# Patient Record
Sex: MALE | URBAN - NONMETROPOLITAN AREA
[De-identification: names, ages, dates, MRNs, and addresses within clinical notes are randomized per-mention and may not be internally consistent; named-entity substitution may affect disease eponyms.]

---

## 2020-01-16 ENCOUNTER — APPOINTMENT (RX ONLY)
Dept: URBAN - NONMETROPOLITAN AREA CLINIC 1 | Facility: CLINIC | Age: 70
Setting detail: DERMATOLOGY
End: 2020-01-16

## 2020-01-16 DIAGNOSIS — L81.4 OTHER MELANIN HYPERPIGMENTATION: ICD-10-CM

## 2020-01-16 DIAGNOSIS — L82.1 OTHER SEBORRHEIC KERATOSIS: ICD-10-CM

## 2020-01-16 DIAGNOSIS — D18.0 HEMANGIOMA: ICD-10-CM

## 2020-01-16 DIAGNOSIS — L82.0 INFLAMED SEBORRHEIC KERATOSIS: ICD-10-CM

## 2020-01-16 DIAGNOSIS — L98.8 OTHER SPECIFIED DISORDERS OF THE SKIN AND SUBCUTANEOUS TISSUE: ICD-10-CM

## 2020-01-16 PROBLEM — D18.01 HEMANGIOMA OF SKIN AND SUBCUTANEOUS TISSUE: Status: ACTIVE | Noted: 2020-01-16

## 2020-01-16 PROBLEM — F32.9 MAJOR DEPRESSIVE DISORDER, SINGLE EPISODE, UNSPECIFIED: Status: ACTIVE | Noted: 2020-01-16

## 2020-01-16 PROCEDURE — ? LIQUID NITROGEN

## 2020-01-16 PROCEDURE — ? COUNSELING

## 2020-01-16 PROCEDURE — 99203 OFFICE O/P NEW LOW 30 MIN: CPT | Mod: 25

## 2020-01-16 PROCEDURE — 17110 DESTRUCTION B9 LES UP TO 14: CPT

## 2020-01-16 ASSESSMENT — LOCATION SIMPLE DESCRIPTION DERM
LOCATION SIMPLE: LEFT UPPER BACK
LOCATION SIMPLE: UPPER BACK
LOCATION SIMPLE: CHEST
LOCATION SIMPLE: RIGHT FOREHEAD
LOCATION SIMPLE: LEFT FOREHEAD
LOCATION SIMPLE: SUPERIOR FOREHEAD
LOCATION SIMPLE: RIGHT LIP

## 2020-01-16 ASSESSMENT — LOCATION DETAILED DESCRIPTION DERM
LOCATION DETAILED: LEFT SUPERIOR UPPER BACK
LOCATION DETAILED: LEFT SUPERIOR MEDIAL FOREHEAD
LOCATION DETAILED: LEFT MEDIAL SUPERIOR CHEST
LOCATION DETAILED: SUPERIOR MID FOREHEAD
LOCATION DETAILED: RIGHT SUPERIOR VERMILION LIP
LOCATION DETAILED: INFERIOR THORACIC SPINE
LOCATION DETAILED: RIGHT INFERIOR FOREHEAD

## 2020-01-16 ASSESSMENT — LOCATION ZONE DERM
LOCATION ZONE: FACE
LOCATION ZONE: LIP
LOCATION ZONE: TRUNK

## 2020-01-16 NOTE — PROCEDURE: MIPS QUALITY
Quality 226: Preventive Care And Screening: Tobacco Use: Screening And Cessation Intervention: Patient screened for tobacco use and is an ex/non-smoker
Quality 111:Pneumonia Vaccination Status For Older Adults: Pneumococcal Vaccination Previously Received
Detail Level: Generalized
Quality 130: Documentation Of Current Medications In The Medical Record: Current Medications Documented

## 2020-01-16 NOTE — HPI: EVALUATION OF SKIN LESION(S)
How Severe Are Your Spot(S)?: moderate
Have Your Spot(S) Been Treated In The Past?: has not been treated
Hpi Title: Evaluation of Skin Lesions
Family Member: Daughter
Year Removed: 1900

## 2022-08-22 ENCOUNTER — HOSPITAL ENCOUNTER (INPATIENT)
Facility: MEDICAL CENTER | Age: 72
LOS: 7 days | DRG: 085 | End: 2022-08-29
Attending: EMERGENCY MEDICINE | Admitting: SURGERY
Payer: MEDICARE

## 2022-08-22 ENCOUNTER — HOSPITAL ENCOUNTER (OUTPATIENT)
Dept: RADIOLOGY | Facility: MEDICAL CENTER | Age: 72
End: 2022-08-22

## 2022-08-22 ENCOUNTER — APPOINTMENT (OUTPATIENT)
Dept: RADIOLOGY | Facility: MEDICAL CENTER | Age: 72
DRG: 085 | End: 2022-08-22
Attending: EMERGENCY MEDICINE
Payer: MEDICARE

## 2022-08-22 ENCOUNTER — APPOINTMENT (OUTPATIENT)
Dept: RADIOLOGY | Facility: MEDICAL CENTER | Age: 72
DRG: 085 | End: 2022-08-22
Attending: SURGERY
Payer: MEDICARE

## 2022-08-22 DIAGNOSIS — S06.330A: ICD-10-CM

## 2022-08-22 DIAGNOSIS — Z78.9 NO CONTRAINDICATION TO DEEP VEIN THROMBOSIS (DVT) PROPHYLAXIS: ICD-10-CM

## 2022-08-22 DIAGNOSIS — G20.A1 PARKINSON DISEASE (HCC): ICD-10-CM

## 2022-08-22 DIAGNOSIS — F01.50 VASCULAR DEMENTIA WITHOUT BEHAVIORAL DISTURBANCE (HCC): ICD-10-CM

## 2022-08-22 DIAGNOSIS — T14.90XA TRAUMA: ICD-10-CM
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
## 2022-08-22 DIAGNOSIS — I61.9 INTRAPARENCHYMAL HEMORRHAGE OF BRAIN (HCC): ICD-10-CM

## 2022-08-22 PROBLEM — Z53.09 CONTRAINDICATION TO DEEP VEIN THROMBOSIS (DVT) PROPHYLAXIS: Status: ACTIVE | Noted: 2022-08-22

## 2022-08-22 PROBLEM — U07.1 LAB TEST POSITIVE FOR DETECTION OF COVID-19 VIRUS: Status: ACTIVE | Noted: 2022-08-22

## 2022-08-22 PROBLEM — F03.90 DEMENTIA (HCC): Status: ACTIVE | Noted: 2022-08-22

## 2022-08-22 PROBLEM — S01.01XA SCALP LACERATION, INITIAL ENCOUNTER: Status: ACTIVE | Noted: 2022-08-22

## 2022-08-22 LAB
CFT BLD TEG: 3.6 MIN (ref 4.6–9.1)
CFT P HPASE BLD TEG: 3.7 MIN (ref 4.3–8.3)
CLOT ANGLE BLD TEG: 76.4 DEGREES (ref 63–78)
CLOT LYSIS 30M P MA LENFR BLD TEG: 0.2 % (ref 0–2.6)
CT.EXTRINSIC BLD ROTEM: 0.9 MIN (ref 0.8–2.1)
GLUCOSE BLD STRIP.AUTO-MCNC: 79 MG/DL (ref 65–99)
GLUCOSE BLD STRIP.AUTO-MCNC: 88 MG/DL (ref 65–99)
MCF BLD TEG: 63.5 MM (ref 52–69)
MCF.PLATELET INHIB BLD ROTEM: 23.3 MM (ref 15–32)
PA AA BLD-ACNC: 0 % (ref 0–11)
PA ADP BLD-ACNC: 3.5 % (ref 0–17)
TEG ALGORITHM TGALG: ABNORMAL

## 2022-08-22 PROCEDURE — 700101 HCHG RX REV CODE 250: Performed by: SURGERY

## 2022-08-22 PROCEDURE — 85384 FIBRINOGEN ACTIVITY: CPT

## 2022-08-22 PROCEDURE — G0390 TRAUMA RESPONS W/HOSP CRITI: HCPCS

## 2022-08-22 PROCEDURE — 99291 CRITICAL CARE FIRST HOUR: CPT | Performed by: SURGERY

## 2022-08-22 PROCEDURE — 700105 HCHG RX REV CODE 258: Performed by: SURGERY

## 2022-08-22 PROCEDURE — 99291 CRITICAL CARE FIRST HOUR: CPT

## 2022-08-22 PROCEDURE — 700111 HCHG RX REV CODE 636 W/ 250 OVERRIDE (IP)

## 2022-08-22 PROCEDURE — 96374 THER/PROPH/DIAG INJ IV PUSH: CPT

## 2022-08-22 PROCEDURE — 70450 CT HEAD/BRAIN W/O DYE: CPT | Mod: ME

## 2022-08-22 PROCEDURE — 700111 HCHG RX REV CODE 636 W/ 250 OVERRIDE (IP): Performed by: SURGERY

## 2022-08-22 PROCEDURE — 85576 BLOOD PLATELET AGGREGATION: CPT

## 2022-08-22 PROCEDURE — 8E0ZXY6 ISOLATION: ICD-10-PCS | Performed by: SURGERY

## 2022-08-22 PROCEDURE — 85347 COAGULATION TIME ACTIVATED: CPT

## 2022-08-22 PROCEDURE — 770022 HCHG ROOM/CARE - ICU (200)

## 2022-08-22 PROCEDURE — 82962 GLUCOSE BLOOD TEST: CPT

## 2022-08-22 RX ORDER — AMOXICILLIN 250 MG
1 CAPSULE ORAL
Status: DISCONTINUED | OUTPATIENT
Start: 2022-08-22 | End: 2022-08-24

## 2022-08-22 RX ORDER — OXYCODONE HYDROCHLORIDE 5 MG/1
5 TABLET ORAL
Status: DISCONTINUED | OUTPATIENT
Start: 2022-08-22 | End: 2022-08-23

## 2022-08-22 RX ORDER — LEVETIRACETAM 500 MG/5ML
500 INJECTION, SOLUTION, CONCENTRATE INTRAVENOUS EVERY 12 HOURS
Status: DISCONTINUED | OUTPATIENT
Start: 2022-08-22 | End: 2022-08-24

## 2022-08-22 RX ORDER — BISACODYL 10 MG
10 SUPPOSITORY, RECTAL RECTAL
Status: DISCONTINUED | OUTPATIENT
Start: 2022-08-22 | End: 2022-08-29 | Stop reason: HOSPADM

## 2022-08-22 RX ORDER — POLYETHYLENE GLYCOL 3350 17 G/17G
1 POWDER, FOR SOLUTION ORAL 2 TIMES DAILY
Status: DISCONTINUED | OUTPATIENT
Start: 2022-08-22 | End: 2022-08-24

## 2022-08-22 RX ORDER — HALOPERIDOL 5 MG/ML
INJECTION INTRAMUSCULAR
Status: COMPLETED
Start: 2022-08-22 | End: 2022-08-22

## 2022-08-22 RX ORDER — MIDAZOLAM HYDROCHLORIDE 1 MG/ML
0.5 INJECTION INTRAMUSCULAR; INTRAVENOUS ONCE
Status: DISCONTINUED | OUTPATIENT
Start: 2022-08-22 | End: 2022-08-23

## 2022-08-22 RX ORDER — ACETAMINOPHEN 325 MG/1
650 TABLET ORAL EVERY 6 HOURS PRN
Status: DISCONTINUED | OUTPATIENT
Start: 2022-08-27 | End: 2022-08-24

## 2022-08-22 RX ORDER — BACITRACIN ZINC AND POLYMYXIN B SULFATE 500; 1000 [USP'U]/G; [USP'U]/G
OINTMENT TOPICAL 3 TIMES DAILY
Status: DISCONTINUED | OUTPATIENT
Start: 2022-08-22 | End: 2022-08-26

## 2022-08-22 RX ORDER — HYDRALAZINE HYDROCHLORIDE 20 MG/ML
10 INJECTION INTRAMUSCULAR; INTRAVENOUS EVERY 4 HOURS PRN
Status: DISCONTINUED | OUTPATIENT
Start: 2022-08-22 | End: 2022-08-26

## 2022-08-22 RX ORDER — DOCUSATE SODIUM 100 MG/1
100 CAPSULE, LIQUID FILLED ORAL 2 TIMES DAILY
Status: DISCONTINUED | OUTPATIENT
Start: 2022-08-22 | End: 2022-08-24

## 2022-08-22 RX ORDER — AMOXICILLIN 250 MG
1 CAPSULE ORAL NIGHTLY
Status: DISCONTINUED | OUTPATIENT
Start: 2022-08-22 | End: 2022-08-24

## 2022-08-22 RX ORDER — ENEMA 19; 7 G/133ML; G/133ML
1 ENEMA RECTAL
Status: DISCONTINUED | OUTPATIENT
Start: 2022-08-22 | End: 2022-08-29 | Stop reason: HOSPADM

## 2022-08-22 RX ORDER — OXYCODONE HYDROCHLORIDE 10 MG/1
10 TABLET ORAL
Status: DISCONTINUED | OUTPATIENT
Start: 2022-08-22 | End: 2022-08-23

## 2022-08-22 RX ORDER — SODIUM CHLORIDE 9 MG/ML
INJECTION, SOLUTION INTRAVENOUS CONTINUOUS
Status: DISCONTINUED | OUTPATIENT
Start: 2022-08-22 | End: 2022-08-26

## 2022-08-22 RX ORDER — ACETAMINOPHEN 325 MG/1
650 TABLET ORAL EVERY 6 HOURS
Status: DISCONTINUED | OUTPATIENT
Start: 2022-08-22 | End: 2022-08-24

## 2022-08-22 RX ORDER — ONDANSETRON 4 MG/1
4 TABLET, ORALLY DISINTEGRATING ORAL EVERY 4 HOURS PRN
Status: DISCONTINUED | OUTPATIENT
Start: 2022-08-22 | End: 2022-08-24

## 2022-08-22 RX ORDER — ONDANSETRON 2 MG/ML
4 INJECTION INTRAMUSCULAR; INTRAVENOUS EVERY 4 HOURS PRN
Status: DISCONTINUED | OUTPATIENT
Start: 2022-08-22 | End: 2022-08-26

## 2022-08-22 RX ORDER — FAMOTIDINE 20 MG/1
20 TABLET, FILM COATED ORAL 2 TIMES DAILY
Status: DISCONTINUED | OUTPATIENT
Start: 2022-08-22 | End: 2022-08-24

## 2022-08-22 RX ORDER — LEVETIRACETAM 500 MG/1
500 TABLET ORAL EVERY 12 HOURS
Status: DISCONTINUED | OUTPATIENT
Start: 2022-08-22 | End: 2022-08-24

## 2022-08-22 RX ORDER — HALOPERIDOL 5 MG/ML
2 INJECTION INTRAMUSCULAR EVERY 4 HOURS PRN
Status: DISCONTINUED | OUTPATIENT
Start: 2022-08-22 | End: 2022-08-22

## 2022-08-22 RX ADMIN — FAMOTIDINE 20 MG: 10 INJECTION, SOLUTION INTRAVENOUS at 17:14

## 2022-08-22 RX ADMIN — HALOPERIDOL 2 MG: 5 INJECTION INTRAMUSCULAR at 15:41

## 2022-08-22 RX ADMIN — HYDRALAZINE HYDROCHLORIDE 10 MG: 20 INJECTION INTRAMUSCULAR; INTRAVENOUS at 16:37

## 2022-08-22 RX ADMIN — SODIUM CHLORIDE: 9 INJECTION, SOLUTION INTRAVENOUS at 16:31

## 2022-08-22 RX ADMIN — HYDRALAZINE HYDROCHLORIDE 10 MG: 20 INJECTION INTRAMUSCULAR; INTRAVENOUS at 23:25

## 2022-08-22 RX ADMIN — LEVETIRACETAM 500 MG: 100 INJECTION, SOLUTION INTRAVENOUS at 17:14

## 2022-08-22 RX ADMIN — HALOPERIDOL LACTATE 2 MG: 5 INJECTION, SOLUTION INTRAMUSCULAR at 15:41

## 2022-08-22 RX ADMIN — Medication 1 EACH: at 17:14

## 2022-08-22 ASSESSMENT — LIFESTYLE VARIABLES
EVER HAD A DRINK FIRST THING IN THE MORNING TO STEADY YOUR NERVES TO GET RID OF A HANGOVER: NO
HAVE PEOPLE ANNOYED YOU BY CRITICIZING YOUR DRINKING: NO
TOTAL SCORE: 0
TOTAL SCORE: 0
DO YOU DRINK ALCOHOL: NO
HAVE YOU EVER FELT YOU SHOULD CUT DOWN ON YOUR DRINKING: NO
CONSUMPTION TOTAL: INCOMPLETE
TOTAL SCORE: 0
EVER FELT BAD OR GUILTY ABOUT YOUR DRINKING: NO

## 2022-08-22 ASSESSMENT — COPD QUESTIONNAIRES
DURING THE PAST 4 WEEKS HOW MUCH DID YOU FEEL SHORT OF BREATH: SOME OF THE TIME
HAVE YOU SMOKED AT LEAST 100 CIGARETTES IN YOUR ENTIRE LIFE: NO/DON'T KNOW
COPD SCREENING SCORE: 3
DO YOU EVER COUGH UP ANY MUCUS OR PHLEGM?: NO/ONLY WITH OCCASIONAL COLDS OR INFECTIONS

## 2022-08-22 NOTE — H&P
CHIEF COMPLAINT: Mechanical ground-level fall.  BIG 3 intracranial hemorrhage.     HISTORY OF PRESENT ILLNESS: The patient is a 72 year-old elderly man who was injured in a fall. The patient suffered a mechanical ground-level fall. The patient had an unknown loss of consciousness. The patient denies any chronic anticoagulation or antiplatelet medications. The patient is unable to articulate any subjective complaints.    TRIAGE CATEGORY: The patient was triaged as a Trauma Green Transfer activation. The patient was initially evaluated at [facility] in [place], [place] where CT imaging demonstrated a small left frontal intraparenchymal hemorrhage without shift or mass-effect. The patient was transported to [facility] in [place], [place] for a definitive neurotrauma evaluation. An expeditious primary and secondary survey with required adjuncts was conducted. See Trauma Narrator for full details.    PAST MEDICAL HISTORY: Dementia and Parkinson's disease.    PAST SURGICAL HISTORY:  has no past surgical history on file.    ALLERGIES: Not on File    CURRENT MEDICATIONS:   Home Medications       Reviewed by Rodriguez Montano (Pharmacy Tech) on 08/22/22 at 1417  Med List Status: Unable to Obtain     Medication Last Dose Status        Patient Blade Taking any Medications                         FAMILY HISTORY: family history is not on file.    SOCIAL HISTORY: The patient lives in [facility].  He has a full-time caretaker who assists with compromised activities of daily living secondary to Parkinson's disease.    REVIEW OF SYSTEMS: Comprehensive review of systems is negative with the exception of the aforementioned HPI, PMH, and PSH bullets in accordance with CMS guidelines.    PHYSICAL EXAMINATION:      Vital Signs: BP (!) 176/80   Pulse 71   Temp 36.7 °C (98.1 °F) (Temporal)   Resp 20   Wt 70.3 kg (155 lb)   SpO2 96%   Physical Exam  Vitals and nursing note reviewed.   HENT:      Head:       Comments: Left frontal contusion with bruising     Right Ear: Tympanic membrane normal.      Left Ear: Tympanic membrane normal.      Nose: Nose normal.   Eyes:      Extraocular Movements: Extraocular movements intact.      Conjunctiva/sclera: Conjunctivae normal.      Pupils: Pupils are equal, round, and reactive to light.   Cardiovascular:      Rate and Rhythm: Normal rate and regular rhythm.      Pulses: Normal pulses.   Pulmonary:      Effort: Pulmonary effort is normal.      Breath sounds: Normal breath sounds.   Chest:      Chest wall: No tenderness.   Abdominal:      General: There is no distension.      Palpations: Abdomen is soft.      Tenderness: There is no abdominal tenderness. There is no guarding.   Musculoskeletal:         General: No swelling, deformity or signs of injury. Normal range of motion.      Cervical back: Normal range of motion and neck supple. No tenderness.   Skin:     General: Skin is warm and dry.      Capillary Refill: Capillary refill takes less than 2 seconds.   Neurological:      General: No focal deficit present.      Mental Status: He is confused.      GCS: GCS eye subscore is 4. GCS verbal subscore is 4. GCS motor subscore is 6.      Cranial Nerves: Cranial nerves 2-12 are intact.      Sensory: Sensation is intact.     LABORATORY VALUES:                      IMAGING:   Review of the patient's transfer CT imaging confirms to punctate left frontal intraparenchymal hemorrhages without shift or mass-effect.  Prominent left frontal cranial cephalohematoma.  No underlying skull fracture.  CT imaging of the cervical spine demonstrates chronic degenerative changes and spondylosis without fracture, dislocation, or malalignment.  Chest x-ray demonstrated no fractures, effusions, infiltrates, or pneumothorax.    ASSESSMENT AND PLAN:     Trauma  Mechanical ground-level fall.  BIG 3 intracranial hemorrhage.  Trauma Green Transfer Activation.  Praveen Clemens MD. Trauma  Surgery.    Intraparenchymal hematoma of brain due to trauma without loss of consciousness, initial encounter ([facility])  Transfer CT imaging demonstrated to small left frontal lobe intracranial hemorrhages measuring 9 and 3 mm in the greatest diameter. BIG 3.  Repeat interval CT imaging of the brain stable.  Non-operative management.   Post traumatic pharmacologic seizure prophylaxis for 1 week.  Speech Language Pathology cognitive evaluation.  Didier Banegas MD. Neurosurgeon. [facility].    Parkinson disease (HCC)  Chronic condition treated with donepezil, carbidopa/sinemet  Definitive medication reconciliation pending.    Contraindication to deep vein thrombosis (DVT) prophylaxis  Prophylactic anticoagulation for thrombotic prevention initially contraindicated secondary to elevated bleeding risk.  8/22 Trauma surveillance venous duplex scanning ordered.    Dementia (HCC)  Chronic condition treated with donepezil. Baseline A&O x 1 reported by EMS.  Patient lives at home with wife and caregiver.  Definitive medication reconciliation pending.    Scalp laceration, initial encounter  Left frontal contusion and scalp laceration.  Sutured repair at the referring facility.    Lab test positive for detection of COVID-19 virus  Exposure from family member  Positive home test on 8/17, minimal symptoms  Isolation precautions       DISPOSITION: Trauma ICU.  Trauma tertiary survey.    CRITICAL CARE TIME: 33 minutes excluding procedures.       ____________________________________     Praveen Clemens M.D.    DD: 8/22/2022  2:13 PM

## 2022-08-22 NOTE — ASSESSMENT & PLAN NOTE
Prophylactic anticoagulation for thrombotic prevention initially contraindicated secondary to elevated bleeding risk.  8/23 Prophylactic dose enoxaparin initiated.   Systemic anticoagulation approved by Neurosurgery.   8/26 Pharmacological DVT prophylaxis stopped, Comfort care measures.

## 2022-08-22 NOTE — ED TRIAGE NOTES
Chief Complaint   Patient presents with    Trauma Green     Trauma green transfer from [facility]. Pt sustained a GLF at home with a hematoma and scalp laceration to left forehead. Imaging performed at transferring facility shows 2x bleeds.      Pt BIBA with the above complaint. Hx of dementia, parkinsons, baseline orientation a&o 1x, GCS 14. Pt was covid + as of 8/17    BP (!) 166/98   Pulse 62   Temp 36.2 °C (97.2 °F) (Temporal)   Resp 20   SpO2 96%

## 2022-08-22 NOTE — ED NOTES
Upon assessment by break RN, Pt GCS 14 but AOx1 to self. Pt reaching for unseen objects but redirectable.

## 2022-08-22 NOTE — ASSESSMENT & PLAN NOTE
Transfer CT imaging demonstrated to small left frontal lobe intracranial hemorrhages measuring 9 and 3 mm in the greatest diameter. BIG 3.  Repeat interval CT imaging of the brain stable.  Non-operative management.   Post traumatic pharmacologic seizure prophylaxis for 1 week.  Speech Language Pathology cognitive evaluation.  8/28:  GCS 13. This may be baseline.  Advanced directive.  Family conference to DNR/DNI.  Didier Banegas MD. Neurosurgeon. [facility] [place].

## 2022-08-22 NOTE — PROGRESS NOTES
1618: Pt arrived to ICU    Vitals:   HR: 61  BP: 214/98  RR: 14  SaO2: 96 on 0L O2  Wt: UNABLE TO OBTAIN  Temp  97.9  _____________________________________________________________    2 RN skin check completed with Doug and Floridalma    Areas of concern/Skin observations:  Bilateral heels red / blanching   Left forehead laceration / sutures  Left knee bruising / redness / not blanching   Scabs / abrasions to left and right knees  Redness to left lateral foot  Open wound to coccyx // photo taken    Devices in use, assessed under and interventions (as appropriate) for skin protection:  SCDs, BP cuff, SaO2 monitor  IV   Placed condom catheter    Interventions in place such as:   q2 hour turns  Keeping skin clean and dry  Use of products such as barrier wipes/cream  Waffle cushion while OOB: TBD  Bed Type: awaiting bed  Mobility: TBD  ______________________________________________________________    Personal belongings:   none  ____________________________________________________________    4 Eyes Skin Assessment Completed by JUDE castro and JUDE sharma.    Head Incision  Ears WDL  Nose WDL  Mouth WDL  Neck WDL  Breast/Chest WDL-device implanted   Shoulder Blades WDL  Spine WDL  (R) Arm/Elbow/Hand Redness, Bruising, and Abrasion  (L) Arm/Elbow/Hand Redness, Bruising, and Abrasion  Abdomen WDL  Groin WDL  Scrotum/Coccyx/Buttocks Redness/wound  (R) Leg Redness, Bruising, and Abrasion  (L) Leg Redness, Bruising, and Abrasion  (R) Heel/Foot/Toe Redness and Boggy  (L) Heel/Foot/Toe Redness and Boggy          Devices In Places Blood Pressure Cuff, Pulse Ox, and SCD's      Interventions In Place Q2 Turns    Possible Skin Injury No    Pictures Uploaded Into Epic yes  Wound Consult Placed yes  RN Wound Prevention Protocol Ordered No \

## 2022-08-22 NOTE — ED PROVIDER NOTES
ED Provider Note    Scribed for Mukund Emerson M.D. by Kurt Ledezma. 8/22/2022  2:03 PM    Primary care provider: None noted  Means of arrival: EMS as transfer patient from [facility]  History obtained from: EMS note and patient  History limited by: None    CHIEF COMPLAINT  Chief Complaint   Patient presents with    Trauma Green     Trauma green transfer from [facility]. Pt sustained a GLF at home with a hematoma and scalp laceration to left forehead. Imaging performed at transferring facility shows 2x bleeds.          JENNIFER Gaines is a 72 y.o. male who presents to the Emergency Department as a transfer patient from [facility] for evaluation of ground level fall onset 5 hours ago. Per transfer documents the patient fell this morning getting out of bed with positive head trauma. He prompted to the ED in [facility] for CT scans. The imaging showed two intraparenchymal hemorrhages on left frontal lobe reading 3 mm and 9 mm in size and a big 2 bleed. The CT-chest, neck, and Cspine were negative. The patient has a history of parkinson's and lives with a full time caretaker. He presents associated symptoms of head pain and headache. Denies neck pain, abdominal pain, extremity pain, chest pain, or other trauma. Patient has additional history of being diagnosed with COVID on 8/17/22. The patient is alert to name but confused with commands.      REVIEW OF SYSTEMS  Pertinent positives include head trauma, two intercranial hemorrhages on left frontal lobe reading 3 mm and 9 mm and a big 2 bleed. Pertinent negatives include no neck pain, abdominal pain, extremity pain, chest pain, or other trauma. All other systems reviewed and negative.    PAST MEDICAL HISTORY       SURGICAL HISTORY  patient denies any surgical history    FAMILY HISTORY  None noted    CURRENT MEDICATIONS  Home Medications       Reviewed by Rodriguez Montano (Pharmacy Tech) on 08/22/22 at 1417  Med List Status: Unable to Obtain     Medication  Last Dose Status        Patient Blade Taking any Medications                           ALLERGIES  None noted    PHYSICAL EXAM  VITAL SIGNS: BP (!) 166/98   Pulse 62   Temp 36.2 °C (97.2 °F) (Temporal)   Resp 20   SpO2 96%     Constitutional: Moderate distress, Non-toxic appearance.   HENT: Normocephalic, Bilateral external ears normal, Oropharynx moist, No oral exudates.   Eyes: PERRLA, EOMI, Conjunctiva normal, No discharge.   Neck: No tenderness, Supple, No stridor.   Lymphatic: No lymphadenopathy noted.   Cardiovascular: Normal heart rate, Normal rhythm.   Thorax & Lungs: Clear to auscultation bilaterally, No respiratory distress, No wheezing, No crackles. Neurotransmitter on left chest wall.  Abdomen: Soft, No tenderness, No masses, No pulsatile masses.   Skin: Warm, Dry, No erythema, No rash. Large hematoma with scalp laceration on left forehead that is covered by a bandage.  Extremities:, No edema No cyanosis.   Musculoskeletal: No tenderness to palpation or major deformities noted.  Intact distal pulses  Neurologic: Awake, alert. Moves all extremities spontaneously.  Psychiatric: Affect normal, Judgment normal, Mood normal.     LABS  Results for orders placed or performed during the hospital encounter of 08/22/22   PLATELET MAPPING WITH BASIC TEG   Result Value Ref Range    Reaction Time Initial-R 3.6 (L) 4.6 - 9.1 min    React Time Initial Hep 3.7 (L) 4.3 - 8.3 min    Clot Kinetics-K 0.9 0.8 - 2.1 min    Clot Angle-Angle 76.4 63.0 - 78.0 degrees    Maximum Clot Strength-MA 63.5 52.0 - 69.0 mm    TEG Functional Fibrinogen(MA) 23.3 15.0 - 32.0 mm    Lysis 30 minutes-LY30 0.2 0.0 - 2.6 %    % Inhibition ADP 3.5 0.0 - 17.0 %    % Inhibition AA 0.0 0.0 - 11.0 %    TEG Algorithm Link Algorithm    POCT glucose device results   Result Value Ref Range    POC Glucose, Blood 88 65 - 99 mg/dL       RADIOLOGY  CT-HEAD W/O   Final Result      No worsening acute intracranial hemorrhage.      Stable subcentimeter left  frontal subcortical acute intra-axial hematoma with mild adjacent vasogenic edema      Stable bilateral stimulator leads which cause considerable artifact and left temporal soft tissue contusion/hematoma      Extra-axial spaces are again mildly prominent likely due to the atrophy. Chronic subdural hygromas cannot be excluded.      OUTSIDE IMAGES-DX CHEST   Final Result      OUTSIDE IMAGES-CT CERVICAL SPINE   Final Result      OUTSIDE IMAGES-CT HEAD   Final Result        The radiologist's interpretation of all radiological studies have been reviewed by me.      COURSE & MEDICAL DECISION MAKING  Pertinent Labs & Imaging studies reviewed. (See chart for details)    1:55 PM - Patient seen and examined at the trauma bay as a transfer patient.  Ordered platelet mapping with basic TEG to evaluate his symptoms.     2:05 PM I spoke with the trauma PA to further assess the patient.     2:29 PM Paged Neuro surgery.     2:46 PM - Patient was reevaluated at bedside. I informed the patients family member of my plan to admit today given the patient's current presentation and diagnostic study results. Patient and family member verbalize understanding and support with my plan for admission.     2:47 PM I discussed the patient's case and the above findings with Dr. Banegas (Neuro Surgery) who will further assess the patient.    3:25 PM Patient will be treated with versed 0.5 mg injection.     Decision Making:  Patient with intraparenchymal hemorrhage status post fall, the patient is a big 3, neurosurgery was consulted, patient will be admitted to the ICU, have the patient return with worsening symptoms.    HTN/IDDM FOLLOW UP:  The patient is referred to a primary physician for blood pressure management, diabetic screening, and for all other preventive health concerns    DISPOSITION:  Patient will be hospitalized by Dr. Clemens in critical condition.    FINAL IMPRESSION  1. Intraparenchymal hemorrhage of brain (HCC)    2. Fall, initial  encounter         I, Kurt Ledezma (Scribe), am scribing for, and in the presence of, Mukund Emerson M.D..    Electronically signed by: Kurt Ledezma (Cheryleibtayo), 8/22/2022. C    I, Mukund Emerson M.D. personally performed the services described in this documentation, as scribed by uKrt Ledezma in my presence, and it is both accurate and complete.    The note accurately reflects work and decisions made by me.  Mukund Emerson M.D.  8/22/2022  7:30 PM

## 2022-08-22 NOTE — ASSESSMENT & PLAN NOTE
Chronic condition treated with donepezil. Baseline A&O x 1 reported by EMS.  8/24 Resumed maintenance medication.  8/24 Palliative care consult for advance care planning.  Patient lives at home with wife and caregiver.

## 2022-08-22 NOTE — ED NOTES
Unable to complete med rec at this time.   Pt is unable to participate in an interview at this time. No additional information located in epic  At this time.

## 2022-08-22 NOTE — ASSESSMENT & PLAN NOTE
Chronic condition treated with donepezil, carbidopa/sinemet.  8/24 Resumed maintenance medication.

## 2022-08-22 NOTE — ASSESSMENT & PLAN NOTE
Mechanical ground-level fall.  BIG 3 intracranial hemorrhage.  Trauma Green Transfer Activation.  Praveen Clemens MD. Trauma Surgery.

## 2022-08-23 LAB
ALBUMIN SERPL BCP-MCNC: 3.6 G/DL (ref 3.2–4.9)
ALBUMIN/GLOB SERPL: 1.3 G/DL
ALP SERPL-CCNC: 58 U/L (ref 30–99)
ALT SERPL-CCNC: 11 U/L (ref 2–50)
ANION GAP SERPL CALC-SCNC: 12 MMOL/L (ref 7–16)
APTT PPP: 29.9 SEC (ref 24.7–36)
AST SERPL-CCNC: 16 U/L (ref 12–45)
BASOPHILS # BLD AUTO: 0.3 % (ref 0–1.8)
BASOPHILS # BLD: 0.02 K/UL (ref 0–0.12)
BILIRUB SERPL-MCNC: 0.6 MG/DL (ref 0.1–1.5)
BUN SERPL-MCNC: 25 MG/DL (ref 8–22)
CALCIUM SERPL-MCNC: 8.6 MG/DL (ref 8.5–10.5)
CHLORIDE SERPL-SCNC: 112 MMOL/L (ref 96–112)
CO2 SERPL-SCNC: 20 MMOL/L (ref 20–33)
CREAT SERPL-MCNC: 1.13 MG/DL (ref 0.5–1.4)
EOSINOPHIL # BLD AUTO: 0.11 K/UL (ref 0–0.51)
EOSINOPHIL NFR BLD: 1.8 % (ref 0–6.9)
ERYTHROCYTE [DISTWIDTH] IN BLOOD BY AUTOMATED COUNT: 50.9 FL (ref 35.9–50)
GFR SERPLBLD CREATININE-BSD FMLA CKD-EPI: 68 ML/MIN/1.73 M 2
GLOBULIN SER CALC-MCNC: 2.8 G/DL (ref 1.9–3.5)
GLUCOSE BLD STRIP.AUTO-MCNC: 80 MG/DL (ref 65–99)
GLUCOSE BLD STRIP.AUTO-MCNC: 82 MG/DL (ref 65–99)
GLUCOSE BLD STRIP.AUTO-MCNC: 84 MG/DL (ref 65–99)
GLUCOSE SERPL-MCNC: 93 MG/DL (ref 65–99)
HCT VFR BLD AUTO: 36.2 % (ref 42–52)
HGB BLD-MCNC: 12.6 G/DL (ref 14–18)
IMM GRANULOCYTES # BLD AUTO: 0.02 K/UL (ref 0–0.11)
IMM GRANULOCYTES NFR BLD AUTO: 0.3 % (ref 0–0.9)
INR PPP: 1.12 (ref 0.87–1.13)
LYMPHOCYTES # BLD AUTO: 1.6 K/UL (ref 1–4.8)
LYMPHOCYTES NFR BLD: 25.6 % (ref 22–41)
MCH RBC QN AUTO: 32.4 PG (ref 27–33)
MCHC RBC AUTO-ENTMCNC: 34.8 G/DL (ref 33.7–35.3)
MCV RBC AUTO: 93.1 FL (ref 81.4–97.8)
MONOCYTES # BLD AUTO: 0.49 K/UL (ref 0–0.85)
MONOCYTES NFR BLD AUTO: 7.9 % (ref 0–13.4)
NEUTROPHILS # BLD AUTO: 4 K/UL (ref 1.82–7.42)
NEUTROPHILS NFR BLD: 64.1 % (ref 44–72)
NRBC # BLD AUTO: 0 K/UL
NRBC BLD-RTO: 0 /100 WBC
PLATELET # BLD AUTO: 272 K/UL (ref 164–446)
PMV BLD AUTO: 9.9 FL (ref 9–12.9)
POTASSIUM SERPL-SCNC: 4.1 MMOL/L (ref 3.6–5.5)
PROT SERPL-MCNC: 6.4 G/DL (ref 6–8.2)
PROTHROMBIN TIME: 14.3 SEC (ref 12–14.6)
RBC # BLD AUTO: 3.89 M/UL (ref 4.7–6.1)
SODIUM SERPL-SCNC: 144 MMOL/L (ref 135–145)
WBC # BLD AUTO: 6.2 K/UL (ref 4.8–10.8)

## 2022-08-23 PROCEDURE — 85025 COMPLETE CBC W/AUTO DIFF WBC: CPT

## 2022-08-23 PROCEDURE — 80053 COMPREHEN METABOLIC PANEL: CPT

## 2022-08-23 PROCEDURE — 92610 EVALUATE SWALLOWING FUNCTION: CPT

## 2022-08-23 PROCEDURE — 306565 RIGID MIT RESTRAINT(PAIR): Performed by: SURGERY

## 2022-08-23 PROCEDURE — 700102 HCHG RX REV CODE 250 W/ 637 OVERRIDE(OP): Performed by: SURGERY

## 2022-08-23 PROCEDURE — 700105 HCHG RX REV CODE 258: Performed by: SURGERY

## 2022-08-23 PROCEDURE — 770022 HCHG ROOM/CARE - ICU (200)

## 2022-08-23 PROCEDURE — 82962 GLUCOSE BLOOD TEST: CPT | Mod: 91

## 2022-08-23 PROCEDURE — 85610 PROTHROMBIN TIME: CPT

## 2022-08-23 PROCEDURE — 700111 HCHG RX REV CODE 636 W/ 250 OVERRIDE (IP): Performed by: SURGERY

## 2022-08-23 PROCEDURE — 99233 SBSQ HOSP IP/OBS HIGH 50: CPT | Performed by: SURGERY

## 2022-08-23 PROCEDURE — 700101 HCHG RX REV CODE 250: Performed by: SURGERY

## 2022-08-23 PROCEDURE — 85730 THROMBOPLASTIN TIME PARTIAL: CPT

## 2022-08-23 PROCEDURE — 302240 PAPR UNIT: Performed by: SURGERY

## 2022-08-23 PROCEDURE — A9270 NON-COVERED ITEM OR SERVICE: HCPCS | Performed by: SURGERY

## 2022-08-23 PROCEDURE — 700111 HCHG RX REV CODE 636 W/ 250 OVERRIDE (IP): Performed by: NURSE PRACTITIONER

## 2022-08-23 RX ORDER — MIDODRINE HYDROCHLORIDE 5 MG/1
10 TABLET ORAL 3 TIMES DAILY
COMMUNITY

## 2022-08-23 RX ORDER — ENOXAPARIN SODIUM 100 MG/ML
30 INJECTION SUBCUTANEOUS EVERY 12 HOURS
Status: DISCONTINUED | OUTPATIENT
Start: 2022-08-23 | End: 2022-08-26

## 2022-08-23 RX ORDER — OXYCODONE HYDROCHLORIDE 5 MG/1
5 TABLET ORAL
Status: DISCONTINUED | OUTPATIENT
Start: 2022-08-23 | End: 2022-08-24

## 2022-08-23 RX ORDER — DONEPEZIL HYDROCHLORIDE 5 MG/1
20 TABLET, FILM COATED ORAL EVERY MORNING
COMMUNITY

## 2022-08-23 RX ADMIN — DOCUSATE SODIUM 50 MG AND SENNOSIDES 8.6 MG 1 TABLET: 8.6; 5 TABLET, FILM COATED ORAL at 20:31

## 2022-08-23 RX ADMIN — ACETAMINOPHEN 650 MG: 325 TABLET, FILM COATED ORAL at 20:31

## 2022-08-23 RX ADMIN — LEVETIRACETAM 500 MG: 100 INJECTION, SOLUTION INTRAVENOUS at 05:09

## 2022-08-23 RX ADMIN — Medication 1 EACH: at 05:10

## 2022-08-23 RX ADMIN — FAMOTIDINE 20 MG: 10 INJECTION, SOLUTION INTRAVENOUS at 05:09

## 2022-08-23 RX ADMIN — Medication: at 12:00

## 2022-08-23 RX ADMIN — LEVETIRACETAM 500 MG: 100 INJECTION, SOLUTION INTRAVENOUS at 17:14

## 2022-08-23 RX ADMIN — HYDRALAZINE HYDROCHLORIDE 10 MG: 20 INJECTION INTRAMUSCULAR; INTRAVENOUS at 21:15

## 2022-08-23 RX ADMIN — FAMOTIDINE 20 MG: 10 INJECTION, SOLUTION INTRAVENOUS at 17:14

## 2022-08-23 RX ADMIN — Medication 1 EACH: at 17:14

## 2022-08-23 RX ADMIN — ENOXAPARIN SODIUM 30 MG: 30 INJECTION SUBCUTANEOUS at 17:14

## 2022-08-23 RX ADMIN — HYDRALAZINE HYDROCHLORIDE 10 MG: 20 INJECTION INTRAMUSCULAR; INTRAVENOUS at 14:21

## 2022-08-23 RX ADMIN — SODIUM CHLORIDE: 9 INJECTION, SOLUTION INTRAVENOUS at 14:21

## 2022-08-23 ASSESSMENT — PAIN DESCRIPTION - PAIN TYPE
TYPE: ACUTE PAIN

## 2022-08-23 ASSESSMENT — PAIN SCALES - WONG BAKER
WONGBAKER_NUMERICALRESPONSE: HURTS A LITTLE MORE
WONGBAKER_NUMERICALRESPONSE: DOESN'T HURT AT ALL

## 2022-08-23 NOTE — THERAPY
Speech Language Pathology   Clinical Swallow Evaluation     Patient Name: Garth Gaines  AGE:  72 y.o., SEX:  male  Medical Record #: [id number]  Today's Date: 8/23/2022          Assessment    Patient is 72 y.o. male with a diagnosis of GLF, baseline AOx1, Parkinsons disease, dementia.      Transfer CT imaging demonstrated to small left frontal lobe intracranial hemorrhages measuring 9 and 3 mm in the greatest diameter. BIG 3. Repeat interval CT imaging of the brain stable.    Level of Consciousness: Drowsy, Unarousable  Affect/Behavior: Calm  Follows Directives: No  Orientation: Disoriented x 4  Hearing: Functional hearing  Vision: Functional vision    Prior Living Situation & Level of Function:  No hx with SLP per chart review. Patient is a poor historian, unable to provide any information at this time.     Oral Mechanism Evaluation  Facial Symmetry: Equal  Facial Sensation: Pt did not follow commands to assess  Labial Observations: Pt did not follow commands to assess  Lingual Observations: Pt did not follow commands for assessment  Dentition: Good  Comments: Thick secretions coating soft palate     Voice  Quality: WFL  Resonance: WFL  Intensity: Soft  Cough: Pt did not follow commands to assess  Comments:    Current Method of Nutrition   NPO until cleared by speech pathology    Assessment  Positioning: Kim's (60-90 degrees)  Bolus Administration: SLP  Oxygen Requirements: Room Air  Factor(s) Affecting Performance: Impaired mental status, Impaired command following    Swallowing Trials  Ice: WFL  Thin Liquid (TN0): Impaired    Comments:    Clear liquids trialed ONLY at this time per MD. The patient is minimally participative at this time. Sleeping, answers 'yes' to his name, follows some simple commands inconsistently, opens eyes briefly to command. Patient with impaired oral acceptance, head tilt to the L side affecting acceptance of PO acceptance and containment. Anterior bolus loss of thins observed x1.  Patient with adequate lingual protrusion and timely swallow trigger. No overt s/sx of aspiration appreciated. Unable to adequately assess vocal quality 2/2 poor command following.      Clinical Impressions    Patient is at a higher risk of aspiration given his current lethargy and poor participation in swallow evaluation. Recommend continue NPO status at this time with re-evaluation tomorrow.      Recommendations  1.  NPO with consideration for non-oral nutrition source. Patient is OK to have ice chips to prevent xerostomia  2.  Instrumentation: Instrumental swallow study pending clinical progress  3.  Swallowing Instructions & Precautions:   Supervision: 1:1 feeding with constant supervision  Positioning: Fully upright and midline during oral intake  Medication: Non Oral   Oral Care: Q4h  4. Will follow up for re-evaluation of swallow function tomorrow AM    Plan    Recommend Speech Therapy 3 times per week until therapy goals are met for the following treatments:  Dysphagia Training.     Objective       08/23/22 1021   Initial Contact Note    Initial Contact Note  Order Received and Verified, Speech Therapy Evaluation in Progress with Full Report to Follow.   Vitals   O2 Delivery Device Room air w/o2 available   Prior Level Of Function   Patient's Primary Language English   Patient / Family Goals   Patient / Family Goal #1 none stated   Short Term Goals   Short Term Goal # 1 Pt will consume prefeeding trials without any overt s/sx of aspiration   Education Group   Education Provided Dysphagia   Dysphagia Patient Response Patient;Acceptance;Explanation;Verbal Demonstration

## 2022-08-23 NOTE — CONSULTS
DATE OF SERVICE:  08/22/2022     INPATIENT CONSULTATION     CHIEF COMPLAINT:  Minimal intracranial hemorrhage.     HISTORY OF PRESENT ILLNESS:  This is a 72-year-old man who had a fall   mechanical, no reported loss of consciousness.  He is a baseline A and O x1,   Parkinson's disease, has bilateral deep brain stimulators.  He is not   reportedly on any blood thinners.  He also has dementia.  His CAT scan shows   minimal right parietal area subdural hematoma.  It is near the scatter of the   leads on the right side.  There is no overlying skull fracture.  He has been   neurologically stable since arrival.     PAST MEDICAL HISTORY:  As mentioned in the HPI.     PAST SURGICAL HISTORY:  As mentioned in the HPI.     SOCIAL HISTORY:  As mentioned in the HPI.     PHYSICAL EXAMINATION:  He has minimal eye opening, says ow and stop and being   pinched, move symmetrically x4, but does not follow commands.     ASSESSMENT AND PLAN:  The patient has minimal intracranial hemorrhage.  Repeat   CAT scan is pending.  His blood pressure less than 140. Q. 2-hour neuro   checks, Keppra 500 b.i.d. x7 days. Repeat scan is pending.  Please add coags   as I did not see them in the chart just for evaluation.     Thank you for allowing me to participate in his care. I will defer the rest of   care to trauma service.        ______________________________  MD ELIECER Izaguirre III/STORM/SERJIO    DD:  08/22/2022 18:04  DT:  08/22/2022 21:34    Job#:  [id number]

## 2022-08-23 NOTE — CARE PLAN
The patient is Watcher - Medium risk of patient condition declining or worsening         Progress made toward(s) clinical / shift goals:  PCA pump in place, pt reports significant reduction in pain. Family at bedside with patient multiple times a day.     Patient is not progressing towards the following goals:

## 2022-08-23 NOTE — PROGRESS NOTES
Per wife Margaret, pt takes Rytary ER TID, Mididrine 10 mg 1-3 tabs TID to keep SBP >100, Arecept 20 mg AM.

## 2022-08-23 NOTE — ASSESSMENT & PLAN NOTE
Exposure from family member  Positive home test on 8/17, minimal symptoms.  Per Infectious Disease may come out of isolation on 8/28.   Isolation precautions

## 2022-08-23 NOTE — CARE PLAN
Problem: Knowledge Deficit - Standard  Goal: Patient and family/care givers will demonstrate understanding of plan of care, disease process/condition, diagnostic tests and medications  Outcome: Not Met     Problem: Pain - Standard  Goal: Alleviation of pain or a reduction in pain to the patient’s comfort goal  Outcome: Progressing     Problem: Skin Integrity  Goal: Skin integrity is maintained or improved  Outcome: Progressing  Note: Q2 hour turning in place pillows in use    The patient is Watcher - Medium risk of patient condition declining or worsening    Shift Goals  Clinical Goals: pain control  Patient Goals: pain control    Progress made toward(s) clinical / shift goals:  yes      Patient is not progressing towards the following goals:      Problem: Knowledge Deficit - Standard  Goal: Patient and family/care givers will demonstrate understanding of plan of care, disease process/condition, diagnostic tests and medications  Outcome: Not Met

## 2022-08-23 NOTE — CARE PLAN
Problem: Pain - Standard  Goal: Alleviation of pain or a reduction in pain to the patient’s comfort goal  Outcome: Progressing     Problem: Skin Integrity  Goal: Skin integrity is maintained or improved  Outcome: Progressing   The patient is Stable - Low risk of patient condition declining or worsening    Shift Goals  Clinical Goals: pain control  Patient Goals: pain control

## 2022-08-23 NOTE — PROGRESS NOTES
Trauma / Surgical Daily Progress Note    Date of Service  8/23/2022    Chief Complaint  72 y.o. male admitted 8/22/2022 with GLF, head bleed    Interval Events  GCS 13.  [id number]  Keppra  Clears.    Lovenox   Pt and OT  Cognition evaluation When appropriate.    Baseline dementia    Review of Systems  Review of Systems     Vital Signs for last 24 hours  Temp:  [36.6 °C (97.8 °F)-37.2 °C (99 °F)] 37.2 °C (99 °F)  Pulse:  [58-74] 67  Resp:  [12-38] 19  BP: (118-197)/() 138/78  SpO2:  [86 %-98 %] 98 %    Hemodynamic parameters for last 24 hours       Respiratory Data     Respiration: 19, Pulse Oximetry: 98 %     Work Of Breathing / Effort: Within Normal Limits  RUL Breath Sounds: Diminished, RML Breath Sounds: Diminished, RLL Breath Sounds: Diminished, ROSITA Breath Sounds: Diminished, LLL Breath Sounds: Diminished    Physical Exam  Physical Exam  Eyes:      Pupils: Pupils are equal, round, and reactive to light.   Cardiovascular:      Rate and Rhythm: Regular rhythm.   Pulmonary:      Effort: No respiratory distress.   Abdominal:      Palpations: Abdomen is soft.   Neurological:      Mental Status: He is alert. Mental status is at baseline.      GCS: GCS eye subscore is 4. GCS verbal subscore is 3. GCS motor subscore is 6.       Laboratory  Recent Results (from the past 24 hour(s))   POCT glucose device results    Collection Time: 08/22/22 11:20 PM   Result Value Ref Range    POC Glucose, Blood 79 65 - 99 mg/dL   CBC with Differential: Tomorrow AM    Collection Time: 08/23/22  4:50 AM   Result Value Ref Range    WBC 6.2 4.8 - 10.8 K/uL    RBC 3.89 (L) 4.70 - 6.10 M/uL    Hemoglobin 12.6 (L) 14.0 - 18.0 g/dL    Hematocrit 36.2 (L) 42.0 - 52.0 %    MCV 93.1 81.4 - 97.8 fL    MCH 32.4 27.0 - 33.0 pg    MCHC 34.8 33.7 - 35.3 g/dL    RDW 50.9 (H) 35.9 - 50.0 fL    Platelet Count 272 164 - 446 K/uL    MPV 9.9 9.0 - 12.9 fL    Neutrophils-Polys 64.10 44.00 - 72.00 %    Lymphocytes 25.60 22.00 - 41.00 %    Monocytes 7.90  0.00 - 13.40 %    Eosinophils 1.80 0.00 - 6.90 %    Basophils 0.30 0.00 - 1.80 %    Immature Granulocytes 0.30 0.00 - 0.90 %    Nucleated RBC 0.00 /100 WBC    Neutrophils (Absolute) 4.00 1.82 - 7.42 K/uL    Lymphs (Absolute) 1.60 1.00 - 4.80 K/uL    Monos (Absolute) 0.49 0.00 - 0.85 K/uL    Eos (Absolute) 0.11 0.00 - 0.51 K/uL    Baso (Absolute) 0.02 0.00 - 0.12 K/uL    Immature Granulocytes (abs) 0.02 0.00 - 0.11 K/uL    NRBC (Absolute) 0.00 K/uL   POCT glucose device results    Collection Time: 08/23/22  4:50 AM   Result Value Ref Range    POC Glucose, Blood 84 65 - 99 mg/dL   Comp Metabolic Panel    Collection Time: 08/23/22  5:50 AM   Result Value Ref Range    Sodium 144 135 - 145 mmol/L    Potassium 4.1 3.6 - 5.5 mmol/L    Chloride 112 96 - 112 mmol/L    Co2 20 20 - 33 mmol/L    Anion Gap 12.0 7.0 - 16.0    Glucose 93 65 - 99 mg/dL    Bun 25 (H) 8 - 22 mg/dL    Creatinine 1.13 0.50 - 1.40 mg/dL    Calcium 8.6 8.5 - 10.5 mg/dL    AST(SGOT) 16 12 - 45 U/L    ALT(SGPT) 11 2 - 50 U/L    Alkaline Phosphatase 58 30 - 99 U/L    Total Bilirubin 0.6 0.1 - 1.5 mg/dL    Albumin 3.6 3.2 - 4.9 g/dL    Total Protein 6.4 6.0 - 8.2 g/dL    Globulin 2.8 1.9 - 3.5 g/dL    A-G Ratio 1.3 g/dL   Prothrombin Time    Collection Time: 08/23/22  5:50 AM   Result Value Ref Range    PT 14.3 12.0 - 14.6 sec    INR 1.12 0.87 - 1.13   APTT    Collection Time: 08/23/22  5:50 AM   Result Value Ref Range    APTT 29.9 24.7 - 36.0 sec   ESTIMATED GFR    Collection Time: 08/23/22  5:50 AM   Result Value Ref Range    GFR (CKD-EPI) 68 >60 mL/min/1.73 m 2   POCT glucose device results    Collection Time: 08/23/22 11:14 AM   Result Value Ref Range    POC Glucose, Blood 82 65 - 99 mg/dL   POCT glucose device results    Collection Time: 08/23/22  5:13 PM   Result Value Ref Range    POC Glucose, Blood 80 65 - 99 mg/dL       Fluids    Intake/Output Summary (Last 24 hours) at 8/23/2022 1736  Last data filed at 8/23/2022 1600  Gross per 24 hour   Intake  1949.12 ml   Output --   Net 1949.12 ml       Core Measures & Quality Metrics  Labs reviewed, Medications reviewed and Radiology images reviewed        DVT Prophylaxis: Contraindicated - High bleeding risk          RAP Score Total: 6  CAGE Results: not completed Blood Alcohol>0.08: not completed     Assessment/Plan  Intraparenchymal hematoma of brain due to trauma without loss of consciousness, initial encounter (HCC)- (present on admission)  Assessment & Plan  Transfer CT imaging demonstrated to small left frontal lobe intracranial hemorrhages measuring 9 and 3 mm in the greatest diameter. BIG 3.  Repeat interval CT imaging of the brain stable.  Non-operative management.   Post traumatic pharmacologic seizure prophylaxis for 1 week.  Speech Language Pathology cognitive evaluation.  Didier Banegas MD. Neurosurgeon. [facility].     Lab test positive for detection of COVID-19 virus- (present on admission)  Assessment & Plan  Exposure from family member  Positive home test on 8/17, minimal symptoms  Isolation precautions     Dementia (HCC)- (present on admission)  Assessment & Plan  Chronic condition treated with donepezil. Baseline A&O x 1 reported by EMS.  Patient lives at home with wife and caregiver.  Definitive medication reconciliation pending.    Contraindication to deep vein thrombosis (DVT) prophylaxis- (present on admission)  Assessment & Plan  Prophylactic anticoagulation for thrombotic prevention initially contraindicated secondary to elevated bleeding risk.  8/22 Trauma surveillance venous duplex scanning ordered.    Parkinson disease (HCC)- (present on admission)  Assessment & Plan  Chronic condition treated with donepezil, carbidopa/sinemet  Definitive medication reconciliation pending.    Scalp laceration, initial encounter- (present on admission)  Assessment & Plan  Left frontal contusion and scalp laceration.  Sutured repair at the referring facility.    Trauma- (present on admission)  Assessment &  Plan  Mechanical ground-level fall.  BIG 3 intracranial hemorrhage.  Trauma Green Transfer Activation.  Praveen Clemens MD. Trauma Surgery.      Discussed patient condition with Family, RN, RT, and Pharmacy.  CRITICAL CARE TIME EXCLUDING PROCEDURES: 35  minutes

## 2022-08-23 NOTE — PROGRESS NOTES
Mental status adequate for full examination?: No, history of dementia, minimal verbal responses    Spine cleared (radiologically and/or clinically): Yes    REVIEW OF SYSTEMS:  Review of Systems   Unable to perform ROS: Mental acuity     PHYSICAL EXAMINATION:  Blood Pressure (Abnormal) 154/73   Pulse 62   Temperature 37.2 °C (98.9 °F) (Temporal)   Respiration 15   Weight 72.1 kg (158 lb 15.2 oz)   Oxygen Saturation 94%   Physical Exam  Vitals and nursing note reviewed.   Constitutional:       General: He is not in acute distress.  HENT:      Head: Normocephalic.      Right Ear: External ear normal.      Left Ear: External ear normal.      Nose: Nose normal.      Mouth/Throat:      Mouth: Mucous membranes are dry.   Eyes:      Conjunctiva/sclera: Conjunctivae normal.   Cardiovascular:      Rate and Rhythm: Normal rate and regular rhythm.      Pulses: Normal pulses.   Pulmonary:      Effort: Pulmonary effort is normal. No respiratory distress.      Breath sounds: Normal breath sounds.   Chest:      Chest wall: No tenderness.   Abdominal:      General: There is no distension.      Palpations: Abdomen is soft.      Tenderness: There is no abdominal tenderness. There is no guarding.   Musculoskeletal:         General: No tenderness.      Cervical back: No tenderness.      Comments: Moves all extremities   Skin:     General: Skin is warm and dry.      Capillary Refill: Capillary refill takes less than 2 seconds.   Neurological:      GCS: GCS eye subscore is 4. GCS verbal subscore is 3. GCS motor subscore is 6.   Psychiatric:         Speech: He is noncommunicative.         Cognition and Memory: Cognition is impaired.      Comments: History of dementia       LABORATORY VALUES:  Recent Labs     08/23/22  0450   WBC 6.2   RBC 3.89*   HEMOGLOBIN 12.6*   HEMATOCRIT 36.2*   MCV 93.1   MCH 32.4   MCHC 34.8   RDW 50.9*   PLATELETCT 272   MPV 9.9     Recent Labs     08/23/22  0550   SODIUM 144   POTASSIUM 4.1   CHLORIDE  112   CO2 20   GLUCOSE 93   BUN 25*   CREATININE 1.13   CALCIUM 8.6     Recent Labs     08/23/22  0550   ASTSGOT 16   ALTSGPT 11   TBILIRUBIN 0.6   ALKPHOSPHAT 58   GLOBULIN 2.8   INR 1.12     Recent Labs     08/23/22  0550   APTT 29.9   INR 1.12       IMAGING:  CT-HEAD W/O   Final Result      No worsening acute intracranial hemorrhage.      Stable subcentimeter left frontal subcortical acute intra-axial hematoma with mild adjacent vasogenic edema      Stable bilateral stimulator leads which cause considerable artifact and left temporal soft tissue contusion/hematoma      Extra-axial spaces are again mildly prominent likely due to the atrophy. Chronic subdural hygromas cannot be excluded.      OUTSIDE IMAGES-DX CHEST   Final Result      OUTSIDE IMAGES-CT CERVICAL SPINE   Final Result      OUTSIDE IMAGES-CT HEAD   Final Result          All current laboratory studies/radiology exams reviewed: Yes    Medications reconciliation has been reviewed: No, reconciliation in process, discussed with bedside nurse    Completed Consultations:  Dr. Banegas, neurosurgery    Pending Consultations:  None    Newly Identified Diagnoses and Injuries:  None noted at time of exam    RAP Score Total: 6    CAGE Results: not completed Blood Alcohol>0.08: not completed     Discussed patient condition with RN, Patient, and trauma surgery, Dr. Ann.

## 2022-08-23 NOTE — WOUND TEAM
[facility]  Inpatient Services  Initial Wound and Skin Care Evaluation    Admission Date: 8/22/2022     Last order of IP CONSULT TO WOUND CARE was found on 8/22/2022 from Hospital Encounter on 8/22/2022     HPI, PMH, SH: Reviewed    No past surgical history on file.  Social History     Tobacco Use    Smoking status: Not on file    Smokeless tobacco: Not on file   Substance Use Topics    Alcohol use: Not on file     Chief Complaint   Patient presents with    Trauma Green     Trauma green transfer from [facility]. Pt sustained a GLF at home with a hematoma and scalp laceration to left forehead. Imaging performed at transferring facility shows 2x bleeds.      Diagnosis: Trauma [T14.90XA]    Unit where seen by Wound Team: S121/00     WOUND CONSULT/FOLLOW UP RELATED TO:  sacrum      WOUND HISTORY:  The patient is a 72 year-old elderly man who was injured in a fall. The patient suffered a mechanical ground-level fall. The patient had an unknown loss of consciousness.     WOUND ASSESSMENT/LDA  Wound 08/23/22 Pressure Injury Sacrum, Buttocks Right POA st. 2 w/ surrounding MASD (Active)   Wound Image    08/23/22 0930   Site Assessment Pink;Red 08/23/22 0930   Periwound Assessment Pink 08/23/22 0930   Margins Attached edges;Defined edges 08/23/22 0930   Closure Adhesive bandage 08/23/22 0930   Treatments Cleansed;Site care;Offloading 08/23/22 0930   Wound Cleansing Not Applicable 08/23/22 0930   Periwound Protectant Not Applicable 08/23/22 0930   Dressing Cleansing/Solutions Not Applicable 08/23/22 0930   Dressing Options Mepilex 08/23/22 0930   Dressing Changed New 08/23/22 0930   Dressing Status Clean;Dry;Intact 08/23/22 0930   Dressing Change/Treatment Frequency Every 72 hrs, and As Needed 08/23/22 0930   NEXT Dressing Change/Treatment Date 08/26/22 08/23/22 0930   NEXT Weekly Photo (Inpatient Only) 08/30/22 08/23/22 0930   WOUND NURSE ONLY - Pressure Injury Stage 2 08/23/22 0930   Wound Length (cm) 0.5  cm 08/23/22 0930   Wound Width (cm) 0.5 cm 08/23/22 0930   Wound Depth (cm) 0.2 cm 08/23/22 0930   Wound Surface Area (cm^2) 0.25 cm^2 08/23/22 0930   Wound Volume (cm^3) 0.05 cm^3 08/23/22 0930   Number of days: 0        Vascular:    CRYSTAL:   No results found.    Lab Values:    Lab Results   Component Value Date/Time    WBC 6.2 08/23/2022 04:50 AM    RBC 3.89 (L) 08/23/2022 04:50 AM    HEMOGLOBIN 12.6 (L) 08/23/2022 04:50 AM    HEMATOCRIT 36.2 (L) 08/23/2022 04:50 AM        Culture Results show:  No results found for this or any previous visit (from the past 720 hour(s)).    Pain Level/Medicated:  no pain       INTERVENTIONS BY WOUND TEAM:  Chart and images reviewed. Discussed with bedside RN. All areas of concern (based on picture review, LDA review and discussion with bedside RN) have been thoroughly assessed. Documentation of areas based on significant findings. This RN in to assess patient. Performed standard wound care which includes appropriate positioning, dressing removal and non-selective debridement. Pictures and measurements obtained weekly if/when required.  Preparation for Dressing removal: removed mepilex  Non-selectively Debrided with:  NS and gauze.  Sharp debridement: n/a  Eloisa wound: Cleansed with NS  Primary Dressing: mepilex  Secondary (Outer) Dressing: n/a    Interdisciplinary consultation: Patient, Bedside RN (Chrissie)    EVALUATION / RATIONALE FOR TREATMENT:  Most Recent Date:  8/23/22: Patient admitted with POA injury to sacrum/buttocks. Patient fell for unknown amount of time. Patient right sacrum/buttocks with stage 2 pressure injury present and some surrounding MASD. Mepilex to offload area at this time, patient has ribera catheter. Orders placed. Nursing to remove mepilex and apply barrier paste for incontinence.      Goals: Steady decrease in wound area and depth weekly.    WOUND TEAM PLAN OF CARE ([X] for frequency of wound follow up,):   Nursing to follow dressing orders written for wound  care. Contact wound team if area fails to progress, deteriorates or with any questions/concerns if something comes up before next scheduled follow up (See below as to whether wound is following and frequency of wound follow up)  Dressing changes by wound team:                   Follow up 3 times weekly:                NPWT change 3 times weekly:     Follow up 1-2 times weekly:      Follow up Bi-Monthly:                   Follow up as needed:   X  Other (explain):     NURSING PLAN OF CARE ORDERS (X):  Dressing changes: See Dressing Care orders:   Skin care: See Skin Care orders: X  RN Prevention Protocol:   Rectal tube care: See Rectal Tube Care orders:   Other orders:    RSKIN:   CURRENTLY IN PLACE (X), APPLIED THIS VISIT (A), ORDERED (O):   Q shift Costa:  X  Q shift pressure point assessments:  X    Surface/Positioning   Pressure redistribution mattress            Low Airloss   X       Bariatric foam      Bariatric GANGA     Waffle cushion        Waffle Overlay          Reposition q 2 hours  X    TAPs Turning system     Z Mat Pillow     Offloading/Redistribution   Sacral Mepilex (Silicone dressing)  X   Heel Mepilex (Silicone dressing)         Heel float boots (Prevalon boot)             Float Heels off Bed with Pillows    X       Respiratory n/a  Silicone O2 tubing         Gray Foam Ear protectors     Cannula fixation Device (Tender )          High flow offloading Clip    Elastic head band offloading device      Anchorfast                                                         Trach with Optifoam split foam             Containment/Moisture Prevention     Rectal tube or BMS    Purwick/Condom Cath        Salazar Catheter  X  Barrier wipes           Barrier paste       Antifungal tx      Interdry        Mobilization ANDRIY      Up to chair        Ambulate      PT/OT      Nutrition    ANDRIY   Dietician        Diabetes Education      PO     TF     TPN     NPO   # days     Other        Anticipated discharge plans:  n/a  LTACH:        SNF/Rehab:                  Home Health Care:           Outpatient Wound Center:            Self/Family Care:        Other:                  Vac Discharge Needs:   Not Applicable Pt not on a wound vac:     X  Regular Vac while inpatient, alternative dressing at DC:        Regular Vac in use and continued at DC:            Reg. Vac w/ Skin Sub/Biologic in use. Will need to be changed 2x wkly:      Veraflo Vac while inpatient, ok to transition to Regular Vac on Discharge:           Veraflo Vac while inpatient, will need to remain on Veraflo Vac upon discharge:

## 2022-08-24 ENCOUNTER — APPOINTMENT (OUTPATIENT)
Dept: RADIOLOGY | Facility: MEDICAL CENTER | Age: 72
DRG: 085 | End: 2022-08-24
Attending: NURSE PRACTITIONER
Payer: MEDICARE

## 2022-08-24 PROBLEM — R13.10 DYSPHAGIA: Status: ACTIVE | Noted: 2022-08-24

## 2022-08-24 PROBLEM — Z78.9 NO CONTRAINDICATION TO DEEP VEIN THROMBOSIS (DVT) PROPHYLAXIS: Status: ACTIVE | Noted: 2022-08-22

## 2022-08-24 LAB
ALBUMIN SERPL BCP-MCNC: 3.3 G/DL (ref 3.2–4.9)
ALBUMIN/GLOB SERPL: 1.2 G/DL
ALP SERPL-CCNC: 58 U/L (ref 30–99)
ALT SERPL-CCNC: 21 U/L (ref 2–50)
ANION GAP SERPL CALC-SCNC: 12 MMOL/L (ref 7–16)
AST SERPL-CCNC: 21 U/L (ref 12–45)
BASOPHILS # BLD AUTO: 0.5 % (ref 0–1.8)
BASOPHILS # BLD: 0.03 K/UL (ref 0–0.12)
BILIRUB SERPL-MCNC: 0.7 MG/DL (ref 0.1–1.5)
BUN SERPL-MCNC: 27 MG/DL (ref 8–22)
CALCIUM SERPL-MCNC: 8.5 MG/DL (ref 8.5–10.5)
CHLORIDE SERPL-SCNC: 112 MMOL/L (ref 96–112)
CO2 SERPL-SCNC: 19 MMOL/L (ref 20–33)
CREAT SERPL-MCNC: 1.19 MG/DL (ref 0.5–1.4)
EOSINOPHIL # BLD AUTO: 0.3 K/UL (ref 0–0.51)
EOSINOPHIL NFR BLD: 5 % (ref 0–6.9)
ERYTHROCYTE [DISTWIDTH] IN BLOOD BY AUTOMATED COUNT: 54.3 FL (ref 35.9–50)
GFR SERPLBLD CREATININE-BSD FMLA CKD-EPI: 64 ML/MIN/1.73 M 2
GLOBULIN SER CALC-MCNC: 2.8 G/DL (ref 1.9–3.5)
GLUCOSE BLD STRIP.AUTO-MCNC: 82 MG/DL (ref 65–99)
GLUCOSE SERPL-MCNC: 94 MG/DL (ref 65–99)
HCT VFR BLD AUTO: 40.6 % (ref 42–52)
HGB BLD-MCNC: 13.1 G/DL (ref 14–18)
IMM GRANULOCYTES # BLD AUTO: 0.02 K/UL (ref 0–0.11)
IMM GRANULOCYTES NFR BLD AUTO: 0.3 % (ref 0–0.9)
LYMPHOCYTES # BLD AUTO: 1.87 K/UL (ref 1–4.8)
LYMPHOCYTES NFR BLD: 31.1 % (ref 22–41)
MCH RBC QN AUTO: 31.9 PG (ref 27–33)
MCHC RBC AUTO-ENTMCNC: 32.3 G/DL (ref 33.7–35.3)
MCV RBC AUTO: 98.8 FL (ref 81.4–97.8)
MONOCYTES # BLD AUTO: 0.46 K/UL (ref 0–0.85)
MONOCYTES NFR BLD AUTO: 7.7 % (ref 0–13.4)
NEUTROPHILS # BLD AUTO: 3.33 K/UL (ref 1.82–7.42)
NEUTROPHILS NFR BLD: 55.4 % (ref 44–72)
NRBC # BLD AUTO: 0 K/UL
NRBC BLD-RTO: 0 /100 WBC
PLATELET # BLD AUTO: 355 K/UL (ref 164–446)
PMV BLD AUTO: 9.1 FL (ref 9–12.9)
POTASSIUM SERPL-SCNC: 3.9 MMOL/L (ref 3.6–5.5)
PROT SERPL-MCNC: 6.1 G/DL (ref 6–8.2)
RBC # BLD AUTO: 4.11 M/UL (ref 4.7–6.1)
SODIUM SERPL-SCNC: 143 MMOL/L (ref 135–145)
WBC # BLD AUTO: 6 K/UL (ref 4.8–10.8)

## 2022-08-24 PROCEDURE — A9270 NON-COVERED ITEM OR SERVICE: HCPCS | Performed by: SURGERY

## 2022-08-24 PROCEDURE — 700111 HCHG RX REV CODE 636 W/ 250 OVERRIDE (IP): Performed by: SURGERY

## 2022-08-24 PROCEDURE — 770001 HCHG ROOM/CARE - MED/SURG/GYN PRIV*

## 2022-08-24 PROCEDURE — 700102 HCHG RX REV CODE 250 W/ 637 OVERRIDE(OP): Performed by: SURGERY

## 2022-08-24 PROCEDURE — 99233 SBSQ HOSP IP/OBS HIGH 50: CPT | Performed by: NURSE PRACTITIONER

## 2022-08-24 PROCEDURE — 700102 HCHG RX REV CODE 250 W/ 637 OVERRIDE(OP): Performed by: NURSE PRACTITIONER

## 2022-08-24 PROCEDURE — A9270 NON-COVERED ITEM OR SERVICE: HCPCS | Performed by: NURSE PRACTITIONER

## 2022-08-24 PROCEDURE — 82962 GLUCOSE BLOOD TEST: CPT

## 2022-08-24 PROCEDURE — 31720 CLEARANCE OF AIRWAYS: CPT

## 2022-08-24 PROCEDURE — 700111 HCHG RX REV CODE 636 W/ 250 OVERRIDE (IP): Performed by: NURSE PRACTITIONER

## 2022-08-24 PROCEDURE — 80053 COMPREHEN METABOLIC PANEL: CPT

## 2022-08-24 PROCEDURE — 92526 ORAL FUNCTION THERAPY: CPT

## 2022-08-24 PROCEDURE — 700101 HCHG RX REV CODE 250: Performed by: SURGERY

## 2022-08-24 PROCEDURE — 85025 COMPLETE CBC W/AUTO DIFF WBC: CPT

## 2022-08-24 RX ORDER — AMOXICILLIN 250 MG
1 CAPSULE ORAL NIGHTLY
Status: DISCONTINUED | OUTPATIENT
Start: 2022-08-24 | End: 2022-08-26

## 2022-08-24 RX ORDER — LEVETIRACETAM 500 MG/1
500 TABLET ORAL EVERY 12 HOURS
Status: DISCONTINUED | OUTPATIENT
Start: 2022-08-24 | End: 2022-08-26

## 2022-08-24 RX ORDER — ACETAMINOPHEN 325 MG/1
650 TABLET ORAL EVERY 6 HOURS PRN
Status: DISCONTINUED | OUTPATIENT
Start: 2022-08-27 | End: 2022-08-26

## 2022-08-24 RX ORDER — DOCUSATE SODIUM 50 MG/5ML
100 LIQUID ORAL 2 TIMES DAILY
Status: DISCONTINUED | OUTPATIENT
Start: 2022-08-24 | End: 2022-08-26

## 2022-08-24 RX ORDER — CARBIDOPA/LEVODOPA 25MG-250MG
2 TABLET ORAL EVERY 6 HOURS
Status: DISCONTINUED | OUTPATIENT
Start: 2022-08-24 | End: 2022-08-26

## 2022-08-24 RX ORDER — ACETAMINOPHEN 325 MG/1
650 TABLET ORAL EVERY 6 HOURS
Status: DISCONTINUED | OUTPATIENT
Start: 2022-08-24 | End: 2022-08-26

## 2022-08-24 RX ORDER — ONDANSETRON 4 MG/1
4 TABLET, ORALLY DISINTEGRATING ORAL EVERY 4 HOURS PRN
Status: DISCONTINUED | OUTPATIENT
Start: 2022-08-24 | End: 2022-08-26

## 2022-08-24 RX ORDER — FAMOTIDINE 20 MG/1
20 TABLET, FILM COATED ORAL DAILY
Status: DISCONTINUED | OUTPATIENT
Start: 2022-08-25 | End: 2022-08-25

## 2022-08-24 RX ORDER — LEVETIRACETAM 500 MG/5ML
500 INJECTION, SOLUTION, CONCENTRATE INTRAVENOUS EVERY 12 HOURS
Status: DISCONTINUED | OUTPATIENT
Start: 2022-08-24 | End: 2022-08-26

## 2022-08-24 RX ORDER — DONEPEZIL HYDROCHLORIDE 5 MG/1
20 TABLET, FILM COATED ORAL EVERY MORNING
Status: DISCONTINUED | OUTPATIENT
Start: 2022-08-24 | End: 2022-08-26

## 2022-08-24 RX ORDER — OXYCODONE HYDROCHLORIDE 5 MG/1
2.5 TABLET ORAL
Status: DISCONTINUED | OUTPATIENT
Start: 2022-08-24 | End: 2022-08-24

## 2022-08-24 RX ORDER — POLYETHYLENE GLYCOL 3350 17 G/17G
1 POWDER, FOR SOLUTION ORAL 2 TIMES DAILY
Status: DISCONTINUED | OUTPATIENT
Start: 2022-08-24 | End: 2022-08-26

## 2022-08-24 RX ORDER — OXYCODONE HYDROCHLORIDE 5 MG/1
2.5 TABLET ORAL
Status: DISCONTINUED | OUTPATIENT
Start: 2022-08-24 | End: 2022-08-26

## 2022-08-24 RX ORDER — AMOXICILLIN 250 MG
1 CAPSULE ORAL
Status: DISCONTINUED | OUTPATIENT
Start: 2022-08-24 | End: 2022-08-26

## 2022-08-24 RX ORDER — CARBIDOPA/LEVODOPA 25MG-250MG
2 TABLET ORAL EVERY 6 HOURS
Status: DISCONTINUED | OUTPATIENT
Start: 2022-08-24 | End: 2022-08-24

## 2022-08-24 RX ADMIN — ONDANSETRON 4 MG: 2 INJECTION INTRAMUSCULAR; INTRAVENOUS at 01:26

## 2022-08-24 RX ADMIN — Medication 1 EACH: at 17:35

## 2022-08-24 RX ADMIN — DONEPEZIL HYDROCHLORIDE 20 MG: 5 TABLET, FILM COATED ORAL at 15:47

## 2022-08-24 RX ADMIN — ACETAMINOPHEN 650 MG: 325 TABLET ORAL at 17:35

## 2022-08-24 RX ADMIN — CARBIDOPA AND LEVODOPA 2 TABLET: 25; 250 TABLET ORAL at 17:36

## 2022-08-24 RX ADMIN — DOCUSATE SODIUM 100 MG: 100 CAPSULE, LIQUID FILLED ORAL at 05:40

## 2022-08-24 RX ADMIN — LEVETIRACETAM 500 MG: 500 TABLET, FILM COATED ORAL at 17:36

## 2022-08-24 RX ADMIN — ENOXAPARIN SODIUM 30 MG: 30 INJECTION SUBCUTANEOUS at 05:40

## 2022-08-24 RX ADMIN — POLYETHYLENE GLYCOL 3350 1 PACKET: 17 POWDER, FOR SOLUTION ORAL at 17:35

## 2022-08-24 RX ADMIN — HYDRALAZINE HYDROCHLORIDE 10 MG: 20 INJECTION INTRAMUSCULAR; INTRAVENOUS at 13:10

## 2022-08-24 RX ADMIN — ENOXAPARIN SODIUM 30 MG: 30 INJECTION SUBCUTANEOUS at 17:36

## 2022-08-24 RX ADMIN — DOCUSATE SODIUM 100 MG: 50 LIQUID ORAL at 17:35

## 2022-08-24 RX ADMIN — ACETAMINOPHEN 650 MG: 325 TABLET ORAL at 05:39

## 2022-08-24 RX ADMIN — DOCUSATE SODIUM 50 MG AND SENNOSIDES 8.6 MG 1 TABLET: 8.6; 5 TABLET, FILM COATED ORAL at 21:14

## 2022-08-24 RX ADMIN — FAMOTIDINE 20 MG: 10 INJECTION, SOLUTION INTRAVENOUS at 05:40

## 2022-08-24 RX ADMIN — LEVETIRACETAM 500 MG: 100 INJECTION, SOLUTION INTRAVENOUS at 05:40

## 2022-08-24 RX ADMIN — HYDRALAZINE HYDROCHLORIDE 10 MG: 20 INJECTION INTRAMUSCULAR; INTRAVENOUS at 02:08

## 2022-08-24 RX ADMIN — Medication 1 EACH: at 05:40

## 2022-08-24 ASSESSMENT — PAIN SCALES - PAIN ASSESSMENT IN ADVANCED DEMENTIA (PAINAD)
TOTALSCORE: 0
CONSOLABILITY: NO NEED TO CONSOLE
CONSOLABILITY: NO NEED TO CONSOLE
BREATHING: NORMAL
BREATHING: NORMAL
BODYLANGUAGE: RELAXED
CONSOLABILITY: NO NEED TO CONSOLE
FACIALEXPRESSION: SMILING OR INEXPRESSIVE
BREATHING: NORMAL
BREATHING: NORMAL
BODYLANGUAGE: RELAXED
FACIALEXPRESSION: SMILING OR INEXPRESSIVE
FACIALEXPRESSION: SMILING OR INEXPRESSIVE
BREATHING: NORMAL
BODYLANGUAGE: RELAXED
TOTALSCORE: 0
BODYLANGUAGE: RELAXED
CONSOLABILITY: NO NEED TO CONSOLE
FACIALEXPRESSION: SMILING OR INEXPRESSIVE
BREATHING: NORMAL
TOTALSCORE: 0
BODYLANGUAGE: RELAXED
BODYLANGUAGE: RELAXED
FACIALEXPRESSION: SMILING OR INEXPRESSIVE
CONSOLABILITY: NO NEED TO CONSOLE
FACIALEXPRESSION: SMILING OR INEXPRESSIVE
FACIALEXPRESSION: SMILING OR INEXPRESSIVE
CONSOLABILITY: NO NEED TO CONSOLE
TOTALSCORE: 0
FACIALEXPRESSION: SMILING OR INEXPRESSIVE
BODYLANGUAGE: RELAXED
BREATHING: NORMAL
TOTALSCORE: 0
CONSOLABILITY: NO NEED TO CONSOLE
CONSOLABILITY: NO NEED TO CONSOLE
BODYLANGUAGE: RELAXED
TOTALSCORE: 0
BREATHING: NORMAL

## 2022-08-24 ASSESSMENT — FIBROSIS 4 INDEX
FIB4 SCORE: 0.96
FIB4 SCORE: 0.96

## 2022-08-24 ASSESSMENT — PAIN SCALES - WONG BAKER
WONGBAKER_NUMERICALRESPONSE: DOESN'T HURT AT ALL
WONGBAKER_NUMERICALRESPONSE: DOESN'T HURT AT ALL

## 2022-08-24 NOTE — DISCHARGE PLANNING
Patient discussed in IDT rounds with Dr. Ann. Palliative care consulted. Family involved with care. No HCM needs discussed during rounds. Patient lives in [place] with spouse. Patient is retired and as Medicare & [employer] of [place] for health insurance. Anticipate post-acute placement. Will continue to follow.     Care Transition Team Assessment    Information Source  Orientation Level: Unable to assess  Information Given By: Other (Comments)  Informant's Name: EMR  Who is responsible for making decisions for patient? : Legal next of kin  Name(s) of Primary Decision Maker: spouse    Readmission Evaluation  Is this a readmission?: No    Elopement Risk  Legal Hold: No  Ambulatory or Self Mobile in Wheelchair: No-Not an Elopement Risk  Elopement Risk: Not at Risk for Elopement    Discharge Preparedness  What is your plan after discharge?: Uncertain - pending medical team collaboration  What are your discharge supports?: Spouse  Prior Functional Level: Ambulatory, Needs Assist with Activities of Daily Living, Needs Assist with Medication Management    Functional Assesment  Prior Functional Level: Ambulatory, Needs Assist with Activities of Daily Living, Needs Assist with Medication Management    Finances  Financial Barriers to Discharge: No  Prescription Coverage: Yes   Discharge Risks or Barriers  Discharge risks or barriers?: Post-acute placement / services, Complex medical needs    Anticipated Discharge Information  Discharge Disposition: D/T to SNF with Medicare cert in anticipation of skilled care

## 2022-08-24 NOTE — PROGRESS NOTES
Discussed swallow evaluation results with patient's wife Margaret, she is agreeable to cortrak placement. APRN updated, orders entered.

## 2022-08-24 NOTE — CARE PLAN
The patient is Stable - Low risk of patient condition declining or worsening    Pt is A/O x 1. Opens eyes to voice, and follows multiple commands. Pt is able to answer simple questions. Pupils are equal and briskly reactive. Q2 Neuro exams completed and pt's exam remained stable during shift. Pt mobilized to edge of bed with two person assist, and tolerated well. Bed bath given, chg wipes applied, and linens were changed. SBP intermittently was >160, however correlated with spastic episodes and mild agitation. PRN hydralazine given x 2. Q2H turns performed. Bed alarm on, safety precautions in place. Call light within reach.     Shift Goals  Clinical Goals: SBP <160, Stable Nuero exam, Rest, Bath  Patient Goals: Rest, comfort  Family Goals: ANDRIY    Progress made toward(s) clinical / shift goals:      Problem: Pain - Standard  Goal: Alleviation of pain or a reduction in pain to the patient’s comfort goal  Outcome: Progressing     Problem: Skin Integrity  Goal: Skin integrity is maintained or improved  Outcome: Progressing     Problem: Fall Risk  Goal: Patient will remain free from falls  Outcome: Progressing

## 2022-08-24 NOTE — PROGRESS NOTES
Per Rachelle with infection prevention, this patient follows 10 day criteria, and may have isolation precautions discontinued on 8-28.

## 2022-08-24 NOTE — ASSESSMENT & PLAN NOTE
8/23 Failed swallow evaluation.  NPO with consideration for non-oral nutrition source.  8/24 Failed swallow evaluation.  Cortrak placed.   8/26 Diet as tolerated.  Comfort Care measures.

## 2022-08-24 NOTE — PROGRESS NOTES
Trauma / Surgical Daily Progress Note    Date of Service  8/24/2022    Chief Complaint  72 y.o. male admitted 8/22/2022 with a intracranial hemorrhage status post ground-level fall.  Past medical history significant for Parkinson's disease, dementia, and positive outpatient COVID-19 test.    Interval Events    GCS 13.  Dysphagia.  Repeat swallow evaluation by speech language pathology recommending continued NPO.  Isolation for COVID-19 reviewed with infectious disease.     -Baseline mentation per review of the records.  -Placement of a nasoenteric tube for nutrition to be discussed with family.  -Infectious disease recommending COVID isolation until 8/28.  -Palliative care consult for advance care planning.  -Clinically stable at this time.  Transfer to [facility].  -Counseled.    Review of Systems  Review of Systems   Unable to perform ROS: Dementia      Vital Signs  Temp:  [36.7 °C (98 °F)-37.3 °C (99.2 °F)] 36.8 °C (98.2 °F)  Pulse:  [58-78] 69  Resp:  [11-57] 15  BP: (118-197)/(58-89) 143/61  SpO2:  [85 %-99 %] 98 %    Physical Exam  Physical Exam  HENT:      Head: Normocephalic.      Comments: Scalp wound approximated.     Nose: Nose normal. No congestion.      Mouth/Throat:      Mouth: Mucous membranes are dry.      Pharynx: Oropharynx is clear.   Eyes:      General: No scleral icterus.     Pupils: Pupils are equal, round, and reactive to light.   Cardiovascular:      Rate and Rhythm: Normal rate.      Pulses: Normal pulses.   Pulmonary:      Effort: No respiratory distress.   Abdominal:      Palpations: Abdomen is soft.      Tenderness: There is no guarding or rebound.   Genitourinary:     Comments: Condom catheter.  Musculoskeletal:      Cervical back: Neck supple.      Comments: Moves all extremities.   Skin:     General: Skin is warm and dry.      Capillary Refill: Capillary refill takes less than 2 seconds.   Neurological:      Mental Status: He is alert. Mental status is at baseline.      GCS: GCS eye  subscore is 4. GCS verbal subscore is 3. GCS motor subscore is 6.       Laboratory  Recent Results (from the past 24 hour(s))   POCT glucose device results    Collection Time: 08/23/22 11:14 AM   Result Value Ref Range    POC Glucose, Blood 82 65 - 99 mg/dL   POCT glucose device results    Collection Time: 08/23/22  5:13 PM   Result Value Ref Range    POC Glucose, Blood 80 65 - 99 mg/dL   POCT glucose device results    Collection Time: 08/24/22 12:19 AM   Result Value Ref Range    POC Glucose, Blood 82 65 - 99 mg/dL   CBC with Differential: Tomorrow AM    Collection Time: 08/24/22  3:43 AM   Result Value Ref Range    WBC 6.0 4.8 - 10.8 K/uL    RBC 4.11 (L) 4.70 - 6.10 M/uL    Hemoglobin 13.1 (L) 14.0 - 18.0 g/dL    Hematocrit 40.6 (L) 42.0 - 52.0 %    MCV 98.8 (H) 81.4 - 97.8 fL    MCH 31.9 27.0 - 33.0 pg    MCHC 32.3 (L) 33.7 - 35.3 g/dL    RDW 54.3 (H) 35.9 - 50.0 fL    Platelet Count 355 164 - 446 K/uL    MPV 9.1 9.0 - 12.9 fL    Neutrophils-Polys 55.40 44.00 - 72.00 %    Lymphocytes 31.10 22.00 - 41.00 %    Monocytes 7.70 0.00 - 13.40 %    Eosinophils 5.00 0.00 - 6.90 %    Basophils 0.50 0.00 - 1.80 %    Immature Granulocytes 0.30 0.00 - 0.90 %    Nucleated RBC 0.00 /100 WBC    Neutrophils (Absolute) 3.33 1.82 - 7.42 K/uL    Lymphs (Absolute) 1.87 1.00 - 4.80 K/uL    Monos (Absolute) 0.46 0.00 - 0.85 K/uL    Eos (Absolute) 0.30 0.00 - 0.51 K/uL    Baso (Absolute) 0.03 0.00 - 0.12 K/uL    Immature Granulocytes (abs) 0.02 0.00 - 0.11 K/uL    NRBC (Absolute) 0.00 K/uL   Comp Metabolic Panel (CMP): Tomorrow AM    Collection Time: 08/24/22  3:43 AM   Result Value Ref Range    Sodium 143 135 - 145 mmol/L    Potassium 3.9 3.6 - 5.5 mmol/L    Chloride 112 96 - 112 mmol/L    Co2 19 (L) 20 - 33 mmol/L    Anion Gap 12.0 7.0 - 16.0    Glucose 94 65 - 99 mg/dL    Bun 27 (H) 8 - 22 mg/dL    Creatinine 1.19 0.50 - 1.40 mg/dL    Calcium 8.5 8.5 - 10.5 mg/dL    AST(SGOT) 21 12 - 45 U/L    ALT(SGPT) 21 2 - 50 U/L    Alkaline  Phosphatase 58 30 - 99 U/L    Total Bilirubin 0.7 0.1 - 1.5 mg/dL    Albumin 3.3 3.2 - 4.9 g/dL    Total Protein 6.1 6.0 - 8.2 g/dL    Globulin 2.8 1.9 - 3.5 g/dL    A-G Ratio 1.2 g/dL   ESTIMATED GFR    Collection Time: 08/24/22  3:43 AM   Result Value Ref Range    GFR (CKD-EPI) 64 >60 mL/min/1.73 m 2       Fluids    Intake/Output Summary (Last 24 hours) at 8/24/2022 0833  Last data filed at 8/24/2022 0800  Gross per 24 hour   Intake 2067 ml   Output 925 ml   Net 1142 ml       Core Measures & Quality Metrics  Labs reviewed, Medications reviewed and Radiology images reviewed  Salazar catheter: No Salazar      DVT Prophylaxis: Enoxaparin (Lovenox)    Ulcer prophylaxis: Yes    Assessed for rehab: Patient unable to tolerate rehabilitation therapeutic regimen  RAP Score Total: 6  CAGE Results: not completed Blood Alcohol>0.08: not completed     Assessment/Plan  Intraparenchymal hematoma of brain due to trauma without loss of consciousness, initial encounter (HCC)- (present on admission)  Assessment & Plan  Transfer CT imaging demonstrated to small left frontal lobe intracranial hemorrhages measuring 9 and 3 mm in the greatest diameter. BIG 3.  Repeat interval CT imaging of the brain stable.  Non-operative management.   Post traumatic pharmacologic seizure prophylaxis for 1 week.  Speech Language Pathology cognitive evaluation.  Didier Banegas MD. Neurosurgeon. [facility].     Dysphagia- (present on admission)  Assessment & Plan  8/23 Failed swallow evaluation.  NPO with consideration for non-oral nutrition source.  8/24 Speech-language pathology for reevaluation.  Failed swallow evaluation.  Recommending Cortrak.    Lab test positive for detection of COVID-19 virus- (present on admission)  Assessment & Plan  Exposure from family member  Positive home test on 8/17, minimal symptoms.  Per Infectious Disease may come out of isolation on 8/28.   Isolation precautions     Dementia (HCC)- (present on admission)  Assessment &  Plan  Chronic condition treated with donepezil. Baseline A&O x 1 reported by EMS.  NPO secondary to dysphagia.  Resume when diet initiated or nasoenteric tube placed.  Patient lives at home with wife and caregiver.  8/24 Palliative care consult for advance care planning.    Parkinson disease (HCC)- (present on admission)  Assessment & Plan  Chronic condition treated with donepezil, carbidopa/sinemet  8/24 NPO secondary to dysphagia.  Resume when diet initiated or nasoenteric tube placed.    Scalp laceration, initial encounter- (present on admission)  Assessment & Plan  Left frontal contusion and scalp laceration.  Sutured repair at the referring facility.    No contraindication to deep vein thrombosis (DVT) prophylaxis- (present on admission)  Assessment & Plan  Prophylactic anticoagulation for thrombotic prevention initially contraindicated secondary to elevated bleeding risk.  8/23 Prophylactic dose enoxaparin initiated.   Systemic anticoagulation approved by Neurosurgery.    Trauma- (present on admission)  Assessment & Plan  Mechanical ground-level fall.  BIG 3 intracranial hemorrhage.  Trauma Green Transfer Activation.  Praveen Clemens MD. Trauma Surgery.         Discussed patient condition with Patient and trauma surgery, Dr. Yosvany Ann.

## 2022-08-24 NOTE — CARE PLAN
The patient is Watcher - Medium risk of patient condition declining or worsening    Shift Goals  Clinical Goals: stable neuro exam, maintain blood pressure  Patient Goals: comfort  Family Goals: unable to assess    Progress made toward(s) clinical / shift goals: Patient on continuous VS checks, patient neuro status cchked per order.  Problem: Neuro Status  Goal: Neuro status will remain stable or improve  Outcome: Progressing     Problem: Hemodynamics  Goal: Patient's hemodynamics, fluid balance and neurologic status will be stable or improve  Outcome: Progressing     Patient is not progressing towards the following goals: n/a

## 2022-08-24 NOTE — DIETARY
"Nutrition Support Assessment:  Day 2 of admit.  Garth Gaines is a 72 y.o. male with admitting DX of trauma.     Current problem list:  Dysphagia  Lab test positive for detection of COVID-19 virus  Dementia  Parkinson disease  Scalp laceration  No contraindication to deep vein thrombosis  Intraparenchymal hematoma of brain due to trauma without loss of consciousness, initial encounter.     Assessment:  Estimated Nutritional Needs based on:   Height: 177.8 cm (5' 10\")  Weight: 60.7 kg (133 lb 13.1 oz) on  via bed scale. Pt -12 kg via bed scale in 1 day likely d/t scale error. Weight on 22 via EMR is 62.1 kg.  Weight to use in calculations: 60.7 kg.   Body mass index is 19.2 kg/m²., BMI classification: Normal weight.    Calculation/Equation:  MSJ * 1.2 = 1,625 kcal/day  Total Calories / day: 1,608 - 1,669  (Calories / k.5 - 27.5)  Total Grams Protein / day: 60 - 105  (Grams Protein / k - 1.5)     Evaluation:   Non-oral nutrition recommended by SLP per note. Consult per APN.  Cortrak placed;  \"Feeding tube with tip projecting over the expected area of the stomach.\" Per diagnostic.  Per RN note on , Pt has a stage 2 pressure injury to scarum/buttocks. Wound team following up as needed.  Current clinical picture and progress notes reviewed  Labs: Bun 27 (H).  Meds: BM protocol.  Last BM: PTA  Specialty formula justified in order to better meet Pt's protein nutritional needs.     Malnutrition Risk: Unable to assess at this time as unable to interview as Pt has hx of dementia.     Recommendations/Plan:  Initiate Impact Peptide 1.5 at 25 mL/hr and then advance per protocol to a goal of 45 mL/hr. Impact Peptide 1.5, goal rate 45 ml/hr, providing 1620 kcals, 101 grams protein, 831 mL free water.   Recommend an additional 800 mL of free water/day (based on 1 kcal/mL)  Fluids per MD  Monitor weight    RD following.                        "

## 2022-08-24 NOTE — THERAPY
Speech Language Pathology  Daily Treatment     Patient Name: Garth Gaines  Age:  72 y.o., Sex:  male  Medical Record #: [id number]  Today's Date: 8/24/2022     Precautions  Precautions: (P) Fall Risk, Swallow Precautions ( See Comments)    H&P  Patient is 72 y.o. male with a diagnosis of GLF, baseline AOx1, Parkinsons disease, dementia. Transfer CT imaging demonstrated to small left frontal lobe intracranial hemorrhages measuring 9 and 3 mm in the greatest diameter. BIG 3.    Subjective  Per RN, pt responsive and able to participate w/ auditory stimuli. Pt lethargic but followed simple commands and appropriately participated throughout tx session. Appropriate precautions observed per COVID dx. Per APN, consult to be made for palliative care.     Assessment  Pre-feeding PO trials administered by SLP. Ice chips x4, tsp water x2, 1/2 tsp applesauce x1. Pt w/ open mouth posture at rest, poor oral acceptance w/o cues. Followed 1-step commands to close mouth to accept bolus, reduced oral stripping of bolus from spoon. Pt w/ functional lingual manipulation and minimally impaired but functional labial seal upon administration of bolus.    Pt demonstrated congested cough before PO trials, worsened w/ limited PO. Pt w/ sustained cough following 1/2 tsp of liquidized solids, required suctioning to clear. Pt w/ weak cued cough, appropriate strength for spontaneous cough. Wet vocal quality x1, improved through use of cued cough and second swallow. Pt requires max cues to initiate observable dry swallow.     Secretions in oral cavity improved from CSE 8/23.    Clinical Impressions  Pt presents w/ clinical s/sx of oropharyngeal dysphagia, given change in vocal quality, congested cough w/ PO, and impaired oral phase. Given s/sx of aspiration, pt lethargy, and secretions in oral cavity suspicious for poor management of secretions, pt should remain NPO. Pending goals of care, consider non-oral nutrition. Speech will follow.    "  Recommendations  1.  NPO with consideration of non-oral nutrition.   2.  Ice chips x5/hour w/ RN to prevent xerostomia and atrophy  2.  Swallowing Instructions & Precautions:   Supervision: 1:1 feeding with constant supervision  Positioning: Fully upright and midline during oral intake  Medication: Non Oral   Oral Care: Q4h    Plan  Continue current treatment plan.    Discharge Recommendations: (P) Recommend post-acute placement for additional speech therapy services prior to discharge home     Objective   08/24/22 0826   Precautions   Precautions Fall Risk;Swallow Precautions ( See Comments)   Vitals   O2 Delivery Device None - Room Air   Cognitive-Linguistic   Level of Consciousness Responds to voice   Orientation Level Not Oriented to Month;Not Oriented to Year   Patient / Family Goals   Patient / Family Goal #1 \"I want more ice\"   Goal #1 Outcome Progressing slower than expected   Short Term Goals   Short Term Goal # 1 Pt will consume prefeeding trials without any overt s/sx of aspiration   Goal Outcome # 1 Progressing slower than expected   Education Group   Education Provided Dysphagia;Role of Speech Therapy   Dysphagia Patient Response Patient;Acceptance;Explanation;Verbal Demonstration;Reinforcement Needed   Role of SLP Patient Response Patient;Acceptance;Explanation;Reinforcement Needed   Anticipated Discharge Needs   Discharge Recommendations Recommend post-acute placement for additional speech therapy services prior to discharge home   Therapy Recommendations Upon DC Dysphagia Training;Patient / Family / Caregiver Education   Interdisciplinary Plan of Care Collaboration   IDT Collaboration with  Nursing;Nurse Practitioner   Patient Position at End of Therapy In Bed;Bed Alarm On;Call Light within Reach  (Mitts applied. All verbalized needs met.)   Collaboration Comments RN, APN aware of recommendations     "

## 2022-08-24 NOTE — PROGRESS NOTES
Cortrak Placement    Tube Team verified patient name and medical record number prior to tube placement.  Cortrak tube (43 inches, 10 Wallisian) placed at 63 cm in right nare.  Per Cortrak picture, tube appears to be in the stomach.  Nursing Instructions: Awaiting KUB to confirm placement before use for medications or feeding. Once placement confirmed, flush tube with 30 ml of water, and then remove and save stylet, in patient medication drawer.

## 2022-08-25 ENCOUNTER — APPOINTMENT (OUTPATIENT)
Dept: RADIOLOGY | Facility: MEDICAL CENTER | Age: 72
DRG: 085 | End: 2022-08-25
Attending: SURGERY
Payer: MEDICARE

## 2022-08-25 LAB
ALBUMIN SERPL BCP-MCNC: 3.7 G/DL (ref 3.2–4.9)
ALBUMIN/GLOB SERPL: 1.5 G/DL
ALP SERPL-CCNC: 59 U/L (ref 30–99)
ALT SERPL-CCNC: 6 U/L (ref 2–50)
ANION GAP SERPL CALC-SCNC: 9 MMOL/L (ref 7–16)
AST SERPL-CCNC: 28 U/L (ref 12–45)
BASOPHILS # BLD AUTO: 0.4 % (ref 0–1.8)
BASOPHILS # BLD: 0.03 K/UL (ref 0–0.12)
BILIRUB SERPL-MCNC: 0.4 MG/DL (ref 0.1–1.5)
BUN SERPL-MCNC: 30 MG/DL (ref 8–22)
CALCIUM SERPL-MCNC: 8.3 MG/DL (ref 8.5–10.5)
CHLORIDE SERPL-SCNC: 114 MMOL/L (ref 96–112)
CO2 SERPL-SCNC: 20 MMOL/L (ref 20–33)
CREAT SERPL-MCNC: 1.11 MG/DL (ref 0.5–1.4)
CRP SERPL HS-MCNC: 0.7 MG/DL (ref 0–0.75)
EOSINOPHIL # BLD AUTO: 0.29 K/UL (ref 0–0.51)
EOSINOPHIL NFR BLD: 3.4 % (ref 0–6.9)
ERYTHROCYTE [DISTWIDTH] IN BLOOD BY AUTOMATED COUNT: 52.3 FL (ref 35.9–50)
GFR SERPLBLD CREATININE-BSD FMLA CKD-EPI: 70 ML/MIN/1.73 M 2
GLOBULIN SER CALC-MCNC: 2.4 G/DL (ref 1.9–3.5)
GLUCOSE SERPL-MCNC: 132 MG/DL (ref 65–99)
HCT VFR BLD AUTO: 37.8 % (ref 42–52)
HGB BLD-MCNC: 12.7 G/DL (ref 14–18)
IMM GRANULOCYTES # BLD AUTO: 0.04 K/UL (ref 0–0.11)
IMM GRANULOCYTES NFR BLD AUTO: 0.5 % (ref 0–0.9)
LYMPHOCYTES # BLD AUTO: 1.83 K/UL (ref 1–4.8)
LYMPHOCYTES NFR BLD: 21.7 % (ref 22–41)
MAGNESIUM SERPL-MCNC: 1.9 MG/DL (ref 1.5–2.5)
MCH RBC QN AUTO: 32.2 PG (ref 27–33)
MCHC RBC AUTO-ENTMCNC: 33.6 G/DL (ref 33.7–35.3)
MCV RBC AUTO: 95.7 FL (ref 81.4–97.8)
MONOCYTES # BLD AUTO: 0.62 K/UL (ref 0–0.85)
MONOCYTES NFR BLD AUTO: 7.4 % (ref 0–13.4)
NEUTROPHILS # BLD AUTO: 5.62 K/UL (ref 1.82–7.42)
NEUTROPHILS NFR BLD: 66.6 % (ref 44–72)
NRBC # BLD AUTO: 0 K/UL
NRBC BLD-RTO: 0 /100 WBC
PHOSPHATE SERPL-MCNC: 2.3 MG/DL (ref 2.5–4.5)
PLATELET # BLD AUTO: 352 K/UL (ref 164–446)
PMV BLD AUTO: 8.9 FL (ref 9–12.9)
POTASSIUM SERPL-SCNC: 3.8 MMOL/L (ref 3.6–5.5)
PREALB SERPL-MCNC: 14.1 MG/DL (ref 18–38)
PROT SERPL-MCNC: 6.1 G/DL (ref 6–8.2)
RBC # BLD AUTO: 3.95 M/UL (ref 4.7–6.1)
SODIUM SERPL-SCNC: 143 MMOL/L (ref 135–145)
WBC # BLD AUTO: 8.4 K/UL (ref 4.8–10.8)

## 2022-08-25 PROCEDURE — 770001 HCHG ROOM/CARE - MED/SURG/GYN PRIV*

## 2022-08-25 PROCEDURE — 700111 HCHG RX REV CODE 636 W/ 250 OVERRIDE (IP): Performed by: SURGERY

## 2022-08-25 PROCEDURE — 80053 COMPREHEN METABOLIC PANEL: CPT

## 2022-08-25 PROCEDURE — A9270 NON-COVERED ITEM OR SERVICE: HCPCS | Performed by: NURSE PRACTITIONER

## 2022-08-25 PROCEDURE — 86140 C-REACTIVE PROTEIN: CPT

## 2022-08-25 PROCEDURE — 700102 HCHG RX REV CODE 250 W/ 637 OVERRIDE(OP): Performed by: NURSE PRACTITIONER

## 2022-08-25 PROCEDURE — 99233 SBSQ HOSP IP/OBS HIGH 50: CPT | Performed by: SURGERY

## 2022-08-25 PROCEDURE — 84100 ASSAY OF PHOSPHORUS: CPT

## 2022-08-25 PROCEDURE — 700111 HCHG RX REV CODE 636 W/ 250 OVERRIDE (IP): Performed by: NURSE PRACTITIONER

## 2022-08-25 PROCEDURE — 31720 CLEARANCE OF AIRWAYS: CPT

## 2022-08-25 PROCEDURE — 700101 HCHG RX REV CODE 250: Performed by: SURGERY

## 2022-08-25 PROCEDURE — 97162 PT EVAL MOD COMPLEX 30 MIN: CPT

## 2022-08-25 PROCEDURE — 71045 X-RAY EXAM CHEST 1 VIEW: CPT

## 2022-08-25 PROCEDURE — 85025 COMPLETE CBC W/AUTO DIFF WBC: CPT

## 2022-08-25 PROCEDURE — 97167 OT EVAL HIGH COMPLEX 60 MIN: CPT

## 2022-08-25 PROCEDURE — 84134 ASSAY OF PREALBUMIN: CPT

## 2022-08-25 PROCEDURE — A9270 NON-COVERED ITEM OR SERVICE: HCPCS | Performed by: SURGERY

## 2022-08-25 PROCEDURE — 92526 ORAL FUNCTION THERAPY: CPT

## 2022-08-25 PROCEDURE — 700102 HCHG RX REV CODE 250 W/ 637 OVERRIDE(OP): Performed by: SURGERY

## 2022-08-25 PROCEDURE — 700105 HCHG RX REV CODE 258: Performed by: SURGERY

## 2022-08-25 PROCEDURE — 83735 ASSAY OF MAGNESIUM: CPT

## 2022-08-25 RX ORDER — MAGNESIUM SULFATE HEPTAHYDRATE 40 MG/ML
2 INJECTION, SOLUTION INTRAVENOUS ONCE
Status: COMPLETED | OUTPATIENT
Start: 2022-08-25 | End: 2022-08-25

## 2022-08-25 RX ADMIN — CARBIDOPA AND LEVODOPA 2 TABLET: 25; 250 TABLET ORAL at 17:45

## 2022-08-25 RX ADMIN — DIBASIC SODIUM PHOSPHATE, MONOBASIC POTASSIUM PHOSPHATE AND MONOBASIC SODIUM PHOSPHATE 500 MG: 852; 155; 130 TABLET ORAL at 08:48

## 2022-08-25 RX ADMIN — CARBIDOPA AND LEVODOPA 2 TABLET: 25; 250 TABLET ORAL at 00:09

## 2022-08-25 RX ADMIN — DOCUSATE SODIUM 100 MG: 50 LIQUID ORAL at 18:00

## 2022-08-25 RX ADMIN — ENOXAPARIN SODIUM 30 MG: 30 INJECTION SUBCUTANEOUS at 17:45

## 2022-08-25 RX ADMIN — FAMOTIDINE 20 MG: 20 TABLET, FILM COATED ORAL at 05:13

## 2022-08-25 RX ADMIN — LEVETIRACETAM 500 MG: 500 TABLET, FILM COATED ORAL at 05:13

## 2022-08-25 RX ADMIN — POLYETHYLENE GLYCOL 3350 1 PACKET: 17 POWDER, FOR SOLUTION ORAL at 05:14

## 2022-08-25 RX ADMIN — ENOXAPARIN SODIUM 30 MG: 30 INJECTION SUBCUTANEOUS at 05:14

## 2022-08-25 RX ADMIN — ACETAMINOPHEN 650 MG: 325 TABLET ORAL at 05:13

## 2022-08-25 RX ADMIN — ACETAMINOPHEN 650 MG: 325 TABLET ORAL at 11:57

## 2022-08-25 RX ADMIN — SODIUM CHLORIDE: 9 INJECTION, SOLUTION INTRAVENOUS at 02:52

## 2022-08-25 RX ADMIN — CARBIDOPA AND LEVODOPA 2 TABLET: 25; 250 TABLET ORAL at 05:13

## 2022-08-25 RX ADMIN — DOCUSATE SODIUM 50 MG AND SENNOSIDES 8.6 MG 1 TABLET: 8.6; 5 TABLET, FILM COATED ORAL at 20:25

## 2022-08-25 RX ADMIN — HYDRALAZINE HYDROCHLORIDE 10 MG: 20 INJECTION INTRAMUSCULAR; INTRAVENOUS at 05:30

## 2022-08-25 RX ADMIN — DIBASIC SODIUM PHOSPHATE, MONOBASIC POTASSIUM PHOSPHATE AND MONOBASIC SODIUM PHOSPHATE 500 MG: 852; 155; 130 TABLET ORAL at 17:46

## 2022-08-25 RX ADMIN — POLYETHYLENE GLYCOL 3350 1 PACKET: 17 POWDER, FOR SOLUTION ORAL at 18:00

## 2022-08-25 RX ADMIN — Medication 1 EACH: at 11:57

## 2022-08-25 RX ADMIN — ACETAMINOPHEN 650 MG: 325 TABLET ORAL at 00:09

## 2022-08-25 RX ADMIN — HYDRALAZINE HYDROCHLORIDE 10 MG: 20 INJECTION INTRAMUSCULAR; INTRAVENOUS at 12:06

## 2022-08-25 RX ADMIN — CARBIDOPA AND LEVODOPA 2 TABLET: 25; 250 TABLET ORAL at 11:57

## 2022-08-25 RX ADMIN — LEVETIRACETAM 500 MG: 500 TABLET, FILM COATED ORAL at 17:46

## 2022-08-25 RX ADMIN — MAGNESIUM SULFATE HEPTAHYDRATE 2 G: 40 INJECTION, SOLUTION INTRAVENOUS at 08:48

## 2022-08-25 RX ADMIN — Medication 1 EACH: at 05:14

## 2022-08-25 RX ADMIN — MAGNESIUM HYDROXIDE 30 ML: 400 SUSPENSION ORAL at 05:14

## 2022-08-25 RX ADMIN — DOCUSATE SODIUM 100 MG: 50 LIQUID ORAL at 05:14

## 2022-08-25 RX ADMIN — DONEPEZIL HYDROCHLORIDE 20 MG: 5 TABLET, FILM COATED ORAL at 05:14

## 2022-08-25 RX ADMIN — ACETAMINOPHEN 650 MG: 325 TABLET ORAL at 17:46

## 2022-08-25 ASSESSMENT — PAIN SCALES - PAIN ASSESSMENT IN ADVANCED DEMENTIA (PAINAD)
BREATHING: NORMAL
BODYLANGUAGE: RELAXED
CONSOLABILITY: NO NEED TO CONSOLE
TOTALSCORE: 0
CONSOLABILITY: NO NEED TO CONSOLE
BODYLANGUAGE: RELAXED
FACIALEXPRESSION: SMILING OR INEXPRESSIVE
BODYLANGUAGE: RELAXED
FACIALEXPRESSION: SMILING OR INEXPRESSIVE
BODYLANGUAGE: RELAXED
CONSOLABILITY: NO NEED TO CONSOLE
FACIALEXPRESSION: SMILING OR INEXPRESSIVE
BREATHING: NORMAL
TOTALSCORE: 0
CONSOLABILITY: NO NEED TO CONSOLE
BREATHING: NORMAL
BODYLANGUAGE: RELAXED
CONSOLABILITY: NO NEED TO CONSOLE
BREATHING: NORMAL
FACIALEXPRESSION: SMILING OR INEXPRESSIVE
TOTALSCORE: 0
BREATHING: NORMAL
FACIALEXPRESSION: SMILING OR INEXPRESSIVE
BODYLANGUAGE: RELAXED
TOTALSCORE: 0
FACIALEXPRESSION: SMILING OR INEXPRESSIVE
BREATHING: NORMAL
TOTALSCORE: 0
FACIALEXPRESSION: SMILING OR INEXPRESSIVE
BREATHING: NORMAL
BODYLANGUAGE: RELAXED
TOTALSCORE: 0
FACIALEXPRESSION: SMILING OR INEXPRESSIVE
CONSOLABILITY: NO NEED TO CONSOLE
BODYLANGUAGE: RELAXED
BREATHING: NORMAL
BODYLANGUAGE: RELAXED
FACIALEXPRESSION: SMILING OR INEXPRESSIVE
CONSOLABILITY: NO NEED TO CONSOLE
BREATHING: NORMAL
CONSOLABILITY: NO NEED TO CONSOLE
TOTALSCORE: 0
CONSOLABILITY: NO NEED TO CONSOLE

## 2022-08-25 ASSESSMENT — COGNITIVE AND FUNCTIONAL STATUS - GENERAL
CLIMB 3 TO 5 STEPS WITH RAILING: TOTAL
TURNING FROM BACK TO SIDE WHILE IN FLAT BAD: UNABLE
PERSONAL GROOMING: TOTAL
DRESSING REGULAR UPPER BODY CLOTHING: TOTAL
DRESSING REGULAR LOWER BODY CLOTHING: TOTAL
STANDING UP FROM CHAIR USING ARMS: TOTAL
MOVING TO AND FROM BED TO CHAIR: UNABLE
TOILETING: TOTAL
WALKING IN HOSPITAL ROOM: TOTAL
SUGGESTED CMS G CODE MODIFIER DAILY ACTIVITY: CN
SUGGESTED CMS G CODE MODIFIER MOBILITY: CN
HELP NEEDED FOR BATHING: TOTAL
EATING MEALS: TOTAL
MOVING FROM LYING ON BACK TO SITTING ON SIDE OF FLAT BED: UNABLE
DAILY ACTIVITIY SCORE: 6
MOBILITY SCORE: 6

## 2022-08-25 ASSESSMENT — PAIN DESCRIPTION - PAIN TYPE
TYPE: ACUTE PAIN
TYPE: ACUTE PAIN

## 2022-08-25 ASSESSMENT — ACTIVITIES OF DAILY LIVING (ADL): TOILETING: REQUIRES ASSIST

## 2022-08-25 ASSESSMENT — GAIT ASSESSMENTS: GAIT LEVEL OF ASSIST: UNABLE TO PARTICIPATE

## 2022-08-25 NOTE — THERAPY
Speech Language Pathology  Daily Treatment     Patient Name: Garth Gaines  Age:  72 y.o., Sex:  male  Medical Record #: [id number]  Today's Date: 8/25/2022     Precautions  Precautions: (P) Fall Risk, Swallow Precautions ( See Comments), Nasogastric Tube  Comments: (P) Bilateral Mitts    H&P  Patient is 72 y.o. male with a diagnosis of GLF, baseline AOx1, Parkinsons disease, dementia. Transfer CT imaging demonstrated to small left frontal lobe intracranial hemorrhages measuring 9 and 3 mm in the greatest diameter. BIG 3. NPO/TF per SLP visit 8/24.    Subjective  Pt awake and lethargic in bed. Good cooperation w/ SLP trials. Initially agreeable to PO, quickly fatigued. Per RN, pt vocal clarity improves w/ oral care. RN reports he recently provided oral care for pt.    Assessment  Pt oriented to self, birthday, wife's name. Pt not oriented to season, location, age, or situation.     Pt demonstrates open mouth posture at rest. Pt w/ impaired acceptance of bolus, required cues from SLP to close mouth around spoon for ice chip trials. Pt w/ functional mastication and labial seal to prevent anterior leakage w/ ice chips. Pt w/ observable swallow x3/3 ice chips trials; however, pt w/ changed voice quality before observable swallow initiation x1/3, concerning for poor orolingual control and aspiration w/ prespill. Pt's cued cough of good strength, congested w/o PO. Cough following PO x1/3 trials. Notable improvements in speech clarity w/ ice chips x3/3 trials, evident of need for continued vigorous oral care to prevent xerostomia. Pt unable to follow max verbal and tactile cues to initiate dry swallow. Pt declined need to suction.     Clinical Impressions  Pt continues to present w/ clinical s/sx of oropharyngeal dysphagia, given poor oral acceptance, suspected poor orolingual control w/ prespill, and cough following ice chips. Pt continues to demonstrate need for tube feeding and NPO status. Recommend frequent oral care and  "use of ice chips w/ RN to prevent xerostomia and improve oropharyngeal sensation.      Recommendations  1.  NPO/TF  2.  Ice chips x5 per hour w/ RN to prevent xerostomia and increase sensation  Supervision: Careful 1:1 hand feeding  Positioning: Fully upright and midline during oral intake   Stop PO ice chips w/ change in respiratory status  3.  Medication: Non Oral  4.  Oral Care: Q4h      Plan    Continue current treatment plan.    Discharge Recommendations: (P) Recommend post-acute placement for additional speech therapy services prior to discharge home       Objective   08/25/22 1105   Precautions   Precautions Fall Risk;Swallow Precautions ( See Comments);Nasogastric Tube   Comments Bilateral Mitts   Vitals   O2 (LPM) 2   O2 Delivery Device Oxymask   Pain 0 - 10 Group   Therapist Pain Assessment Post Activity Pain Same as Prior to Activity  (No s/sx or report of pain throughout)   Patient / Family Goals   Patient / Family Goal #1 \"I want more ice\"   Goal #1 Outcome Progressing slower than expected   Short Term Goals   Short Term Goal # 1 Pt will consume prefeeding trials without any overt s/sx of aspiration   Goal Outcome # 1 Progressing slower than expected   Education Group   Education Provided Role of Speech Therapy   Role of SLP Patient Response Patient;Acceptance;Explanation;Reinforcement Needed   Anticipated Discharge Needs   Discharge Recommendations Recommend post-acute placement for additional speech therapy services prior to discharge home   Therapy Recommendations Upon DC Dysphagia Training;Patient / Family / Caregiver Education   Interdisciplinary Plan of Care Collaboration   IDT Collaboration with  Nursing   Patient Position at End of Therapy In Bed;Bed Alarm On  (Mitts applied. All verbalized needs met.)   Collaboration Comments RN aware of session and recommendations     "

## 2022-08-25 NOTE — CARE PLAN
The patient is Watcher - Medium risk of patient condition declining or worsening    Shift Goals  Clinical Goals: Q4 neuro, monitor BP  Patient Goals: ANDRIY    Progress made toward(s) clinical / shift goals:    Q2hr restraint checks in place. No s/s of distress or discomfort. Cortack in place with bridle.  Bed in lowest and locked position.   Problem: Knowledge Deficit - Standard  Goal: Patient and family/care givers will demonstrate understanding of plan of care, disease process/condition, diagnostic tests and medications  Outcome: Progressing     Problem: Pain - Standard  Goal: Alleviation of pain or a reduction in pain to the patient’s comfort goal  Outcome: Progressing     Problem: Skin Integrity  Goal: Skin integrity is maintained or improved  Outcome: Progressing     Problem: Fall Risk  Goal: Patient will remain free from falls  Outcome: Progressing     Problem: Safety - Medical Restraint  Goal: Remains free of injury from restraints (Restraint for Interference with Medical Device)  Outcome: Progressing     Problem: Safety - Medical Restraint  Goal: Free from restraint(s) (Restraint for Interference with Medical Device)  Outcome: Progressing     Problem: Neuro Status  Goal: Neuro status will remain stable or improve  Outcome: Progressing

## 2022-08-25 NOTE — THERAPY
Occupational Therapy   Initial Evaluation     Patient Name: Garth Gaines  Age:  72 y.o., Sex:  male  Medical Record #: [id number]  Today's Date: 8/25/2022     Precautions: (P) Fall Risk, Swallow Precautions ( See Comments)  Comments: Bilateral Mitts    Assessment  Patient is 72 y.o. male admitted due to GLF with Hx of Parkinson's and dementia found to have intracranial hemorrhage and positive for COVID. Pt's spouse at bedside and able to provide PLOF (see below). He was limited today by weakness, poor command following, poor balance, and lethargy. Pt demonstrated total A for self cares and mobility. Per spouse, pt has been demonstrating slow decline and requiring increased assist for all self cares and mobility, however spouse likely not able to provide assist at current level of care. Recommend 24/7 assist vs post acute placement. Will follow for skilled OT.    Plan    Recommend Occupational Therapy 2 times per week until therapy goals are met for the following treatments:  Adaptive Equipment, Cognitive Skill Development, Neuro Re-Education / Balance, Self Care/Activities of Daily Living, Therapeutic Activities, and Therapeutic Exercises.    DC Equipment Recommendations: (P) Unable to determine at this time  Discharge Recommendations: (P)  (24/7 assist at total A level of care vs post acute placement)        08/25/22 5985   Prior Living Situation   Prior Services Continuous (24 Hour) Care Giving Family;Continuous (24 Hour) Care Giving Per Service   Housing / Facility 2 Story House  (stays on 1st floor)   Bathroom Set up Walk In Shower;Shower Chair   Equipment Owned 4-Wheel Walker;Tub / Shower Seat   Lives with - Patient's Self Care Capacity Spouse   Comments Per spouse, pt recieves assist for all self cares but is able to ambulate short distances with CGA, on/off toilet with CGA and is dependent with IADLs. Spouse reported their caregiver comes daily to assist with self cares   Prior Level of ADL Function   Self  Feeding Independent   Grooming / Hygiene Requires Assist   Bathing Requires Assist   Dressing Requires Assist   Toileting Requires Assist   Prior Level of IADL Function   Medication Management Dependent   Laundry Dependent   Kitchen Mobility Dependent   Finances Dependent   Home Management Dependent   Shopping Dependent   Precautions   Precautions Fall Risk;Swallow Precautions ( See Comments)   Cognition    Cognition / Consciousness X   Orientation Level Not Oriented to Month;Not Oriented to Year   Level of Consciousness Responds to voice   Ability To Follow Commands 1 Step  (with less than 50% accuracy)   Comments Pt very lethargic with difficulty opening eyes (appeared glued shut)   Active ROM Upper Body   Active ROM Upper Body  X   Comments difficulty to assess 2/2 poor command following   Strength Upper Body   Upper Body Strength  X   Gross Strength Generalized Weakness, Equal Bilaterally.    Balance Assessment   Sitting Balance (Static) Poor +   Sitting Balance (Dynamic) Poor   Weight Shift Sitting Poor   Bed Mobility    Supine to Sit Total Assist   Sit to Supine Total Assist   Scooting Total Assist   Rolling Total Assist to Rt.   ADL Assessment   Grooming Total Assist   Upper Body Dressing Total Assist   Lower Body Dressing Total Assist   Toileting Total Assist   How much help from another person does the patient currently need...   6 Clicks Daily Activity Score 6   Functional Mobility   Sit to Stand Unable to Participate   Bed, Chair, Wheelchair Transfer Unable to Participate   Patient / Family Goals   Patient / Family Goal #1 none stated   Short Term Goals   Short Term Goal # 1 Pt will demo fair sitting balance in pre for participation in seated ADLs   Short Term Goal # 2 Pt will follow 1 step commands with 100% accuracy   Education Group   Role of Occupational Therapist Patient Response Family;Patient;Acceptance;Explanation   Problem List   Problem List Decreased Active Daily Living Skills;Decreased Upper  Extremity Strength Right;Decreased Upper Extremity Strength Left;Decreased Functional Mobility;Decreased Activity Tolerance;Impaired Posture / Trunk Alignment;Impaired Cognitive Function;Impaired Postural Control / Balance;Impaired Vision   Anticipated Discharge Equipment and Recommendations   DC Equipment Recommendations Unable to determine at this time   Discharge Recommendations   (and with 24/7 assist vs post acute placement)

## 2022-08-25 NOTE — DISCHARGE PLANNING
Care conference held with wife. Wife requesting DNR/DNI. Reports patient has an advance directive at [facility]. Prior to admission, pt was being screened for Hospice through [facility] and wife has been working with their Palliative Care team. She reports that mentally patient is at his baseline. She is hoping that his respiratory status improves to then be able to take him home.     LMSW faxed an urgent request to [facility] for patient's AD.

## 2022-08-25 NOTE — PROGRESS NOTES
Trauma / Surgical Daily Progress Note    Date of Service  8/25/2022    Chief Complaint  72 y.o. male admitted 8/22/2022 with GLF, head bleed    Interval Events  GCS 13 [id number]  Rhonchi and rales.    CXR:  mild congestion.  No pulmonary edema.    TF goal  DNR/DNI  Lovenox yes  No antibiotics  Advanced directive noted.       Review of Systems  Review of Systems     Vital Signs for last 24 hours  Temp:  [36.6 °C (97.9 °F)-37.7 °C (99.8 °F)] 37.2 °C (99 °F)  Pulse:  [63-84] 73  Resp:  [17-33] 28  BP: (110-173)/(53-84) 115/64  SpO2:  [94 %-100 %] 100 %    Hemodynamic parameters for last 24 hours       Respiratory Data     Respiration: (!) 28, Pulse Oximetry: 100 %     Work Of Breathing / Effort: Within Normal Limits  RUL Breath Sounds: Clear, RML Breath Sounds: Clear, RLL Breath Sounds: Diminished, ROSITA Breath Sounds: Clear, LLL Breath Sounds: Diminished    Physical Exam  Physical Exam  Eyes:      Pupils: Pupils are equal, round, and reactive to light.   Cardiovascular:      Rate and Rhythm: Regular rhythm.   Pulmonary:      Effort: No respiratory distress.      Breath sounds: Rhonchi present.   Abdominal:      Palpations: Abdomen is soft.   Neurological:      Mental Status: He is alert. Mental status is at baseline.      GCS: GCS eye subscore is 4. GCS verbal subscore is 3. GCS motor subscore is 6.       Laboratory  Recent Results (from the past 24 hour(s))   CBC with Differential: Tomorrow AM    Collection Time: 08/25/22  4:25 AM   Result Value Ref Range    WBC 8.4 4.8 - 10.8 K/uL    RBC 3.95 (L) 4.70 - 6.10 M/uL    Hemoglobin 12.7 (L) 14.0 - 18.0 g/dL    Hematocrit 37.8 (L) 42.0 - 52.0 %    MCV 95.7 81.4 - 97.8 fL    MCH 32.2 27.0 - 33.0 pg    MCHC 33.6 (L) 33.7 - 35.3 g/dL    RDW 52.3 (H) 35.9 - 50.0 fL    Platelet Count 352 164 - 446 K/uL    MPV 8.9 (L) 9.0 - 12.9 fL    Neutrophils-Polys 66.60 44.00 - 72.00 %    Lymphocytes 21.70 (L) 22.00 - 41.00 %    Monocytes 7.40 0.00 - 13.40 %    Eosinophils 3.40 0.00 - 6.90 %     Basophils 0.40 0.00 - 1.80 %    Immature Granulocytes 0.50 0.00 - 0.90 %    Nucleated RBC 0.00 /100 WBC    Neutrophils (Absolute) 5.62 1.82 - 7.42 K/uL    Lymphs (Absolute) 1.83 1.00 - 4.80 K/uL    Monos (Absolute) 0.62 0.00 - 0.85 K/uL    Eos (Absolute) 0.29 0.00 - 0.51 K/uL    Baso (Absolute) 0.03 0.00 - 0.12 K/uL    Immature Granulocytes (abs) 0.04 0.00 - 0.11 K/uL    NRBC (Absolute) 0.00 K/uL   Comp Metabolic Panel (CMP): Tomorrow AM    Collection Time: 08/25/22  4:25 AM   Result Value Ref Range    Sodium 143 135 - 145 mmol/L    Potassium 3.8 3.6 - 5.5 mmol/L    Chloride 114 (H) 96 - 112 mmol/L    Co2 20 20 - 33 mmol/L    Anion Gap 9.0 7.0 - 16.0    Glucose 132 (H) 65 - 99 mg/dL    Bun 30 (H) 8 - 22 mg/dL    Creatinine 1.11 0.50 - 1.40 mg/dL    Calcium 8.3 (L) 8.5 - 10.5 mg/dL    AST(SGOT) 28 12 - 45 U/L    ALT(SGPT) 6 2 - 50 U/L    Alkaline Phosphatase 59 30 - 99 U/L    Total Bilirubin 0.4 0.1 - 1.5 mg/dL    Albumin 3.7 3.2 - 4.9 g/dL    Total Protein 6.1 6.0 - 8.2 g/dL    Globulin 2.4 1.9 - 3.5 g/dL    A-G Ratio 1.5 g/dL   Magnesium: Every Monday and Thursday AM    Collection Time: 08/25/22  4:25 AM   Result Value Ref Range    Magnesium 1.9 1.5 - 2.5 mg/dL   Phosphorus: Every Monday and Thursday AM    Collection Time: 08/25/22  4:25 AM   Result Value Ref Range    Phosphorus 2.3 (L) 2.5 - 4.5 mg/dL   CRP QUANTITIVE (NON-CARDIAC)    Collection Time: 08/25/22  4:25 AM   Result Value Ref Range    Stat C-Reactive Protein 0.70 0.00 - 0.75 mg/dL   PREALBUMIN    Collection Time: 08/25/22  4:25 AM   Result Value Ref Range    Pre-Albumin 14.1 (L) 18.0 - 38.0 mg/dL   ESTIMATED GFR    Collection Time: 08/25/22  4:25 AM   Result Value Ref Range    GFR (CKD-EPI) 70 >60 mL/min/1.73 m 2       Fluids    Intake/Output Summary (Last 24 hours) at 8/25/2022 1533  Last data filed at 8/25/2022 1400  Gross per 24 hour   Intake 2962 ml   Output 650 ml   Net 2312 ml       Core Measures & Quality Metrics  Core Measures & Quality  Metrics  RAP Score Total: 6  CAGE Results: not completed Blood Alcohol>0.08: not completed     Assessment/Plan  Intraparenchymal hematoma of brain due to trauma without loss of consciousness, initial encounter (HCC)- (present on admission)  Assessment & Plan  Transfer CT imaging demonstrated to small left frontal lobe intracranial hemorrhages measuring 9 and 3 mm in the greatest diameter. BIG 3.  Repeat interval CT imaging of the brain stable.  Non-operative management.   Post traumatic pharmacologic seizure prophylaxis for 1 week.  Speech Language Pathology cognitive evaluation.  8/28:  GCS 13. This may be baseline.  Advanced directive.  Family conference to DNR/DNI  Didier Banegas MD. Neurosurgeon. [facility].     Dysphagia- (present on admission)  Assessment & Plan  8/23 Failed swallow evaluation.  NPO with consideration for non-oral nutrition source.  8/24 Failed swallow evaluation.  Cortrak placed.     Lab test positive for detection of COVID-19 virus- (present on admission)  Assessment & Plan  Exposure from family member  Positive home test on 8/17, minimal symptoms.  Per Infectious Disease may come out of isolation on 8/28.   Isolation precautions     Dementia (HCC)- (present on admission)  Assessment & Plan  Chronic condition treated with donepezil. Baseline A&O x 1 reported by EMS.  8/24 Resumed maintenance medication.  8/24 Palliative care consult for advance care planning.  Patient lives at home with wife and caregiver.    Parkinson disease (HCC)- (present on admission)  Assessment & Plan  Chronic condition treated with donepezil, carbidopa/sinemet  8/24 Resumed maintenance medication.    Scalp laceration, initial encounter- (present on admission)  Assessment & Plan  Left frontal contusion and scalp laceration.  Sutured repair at the referring facility.    No contraindication to deep vein thrombosis (DVT) prophylaxis- (present on admission)  Assessment & Plan  Prophylactic anticoagulation for thrombotic  prevention initially contraindicated secondary to elevated bleeding risk.  8/23 Prophylactic dose enoxaparin initiated.   Systemic anticoagulation approved by Neurosurgery.    Trauma- (present on admission)  Assessment & Plan  Mechanical ground-level fall.  BIG 3 intracranial hemorrhage.  Trauma Green Transfer Activation.  Praveen Clemens MD. Trauma Surgery.       Discussed patient condition with RN, RT, Pharmacy, and Dietary.  CRITICAL CARE TIME EXCLUDING PROCEDURES: 35   minutes.Decision making of high complexity.  I reviewed clinical labs, trends and orders for follow up.  Review of imaging,reports, consultant documentation .  Utilization of the information in todays decision making.   I evaluated the patient condition at bedside and discussed the daily plan(s) with available nursing staff,  pharmacists on rounds.

## 2022-08-25 NOTE — DIETARY
Nutrition Services: Update   Day 3 of admit.  Garth Gaines is a 72 y.o. male with admitting DX of Trauma [T14.90XA]    Problem: Nutritional:  Goal: Nutrition support tolerated and meeting greater than 85% of estimated needs  Outcome: MET    TF observed on Flowsheet to be running @ goal rate: Impact peptide @ 45mL/hr.

## 2022-08-25 NOTE — RESPIRATORY CARE
Called to bedside for decreased oxygen saturation.  Pt having difficulty clearing secretions due to weak cough.    NT suction catheter passed through Left nare x2 with Moderated amount of clear secretions.    Pt left on 2L oxymask, titrate as patient recovers saturations.

## 2022-08-25 NOTE — THERAPY
"Physical Therapy   Initial Evaluation     Patient Name: Garth Gaines  Age:  72 y.o., Sex:  male  Medical Record #: [id number]  Today's Date: 8/25/2022     Precautions  Precautions: Fall Risk;Swallow Precautions ( See Comments)  Comments: B mittens and restraints    Assessment  Patient is a 72 y.o. male who was admitted s/p GLF, diagnosed with an intracranial hemorrhage and positive for COVID-19. PMH significant for dementia and advanced Parkinson's disease.    Pt received in bed with wife at bedside, agreeable to PT evaluation. Pt required total A for bed mobility and intermittently was able to follow commands. Pt kept his eyes closed throughout the session. Pt presents with global weakness, rigidity in LE, impaired functional mobility, and impaired alertness. Anticipate pt will require 24/7 care upon discharge either at home or a rehab facility prior to home. Will continue to follow for acute PT to progress as able.    Plan    Recommend Physical Therapy 3 times per week until therapy goals are met for the following treatments:  Bed Mobility, Gait Training, Neuro Re-Education / Balance, Self Care/Home Evaluation, Therapeutic Activities, and Therapeutic Exercises    DC Equipment Recommendations: Unable to determine at this time  Discharge Recommendations: Recommend post-acute placement for additional physical therapy services prior to discharge home (versus home with 24/7 caregiving)       Subjective    \"Okay\"     Objective       08/25/22 1410   Initial Contact Note    Initial Contact Note Order Received and Verified, Physical Therapy Evaluation in Progress with Full Report to Follow.   Precautions   Precautions Fall Risk;Swallow Precautions ( See Comments);Nasogastric Tube   Comments B mittens and restraints   Vitals   Vitals Comments Pt on 2L oxymask throughout session without notable desaturation.   Pain 0 - 10 Group   Therapist Pain Assessment Post Activity Pain Same as Prior to Activity;Nurse Notified  (did not " appear to be in pain)   Prior Living Situation   Prior Services Intermittent Physical Support for ADL Per Family;Intermittent Physical Support for ADL Per Service   Housing / Facility 2 Story House  (stays on 1st floor)   Steps Into Home 2   Steps In Home 0   Bathroom Set up Walk In Shower;Shower Chair   Equipment Owned 4-Wheel Walker;Tub / Shower Seat;Hospital Bed   Lives with - Patient's Self Care Capacity Spouse   Comments Per spouse, pt was supervised with mobility a few weeks ago and has gradually been requiring more help with ambulation. Spouse reports daily caregivers coming to assist with ADLs.   Prior Level of Functional Mobility   Bed Mobility Required Assist   Transfer Status Required Assist   Ambulation Required Assist   Distance Ambulation (Feet)   (household only)   Assistive Devices Used 4-Wheel Walker   History of Falls   History of Falls Yes   Date of Last Fall   (reason for admit)   Cognition    Level of Consciousness Responds to voice   Ability To Follow Commands 1 Step  (occasionally able to follow <50% of the time)   Comments Pt lethargic during session and unable to open eyes as they appeared to be glued shut. Pt said a few words, very inconsistent.   Passive ROM Lower Body   Passive ROM Lower Body WDL   Active ROM Lower Body    Comments Limited by strength and command following   Strength Lower Body   Comments Unable to formally assess due to impaired command following. Appears to be globally weak. Wife reports RLE weakner than LLE prior to fall.   Sensation Lower Body   Comments Unable to assess.   Lower Body Muscle Tone   Comments Hypertonic in BLE with knee to chest motion going both directions. Consistent with advanced Parkinson's cogwheel rigidity.   Balance Assessment   Sitting Balance (Static) Poor +   Sitting Balance (Dynamic) Poor   Weight Shift Sitting Poor   Comments Slight posterior lean initially. Once set up in a particular spot, pt able to maintain without disturbances.  Significant kyphosis and forward head posture.   Gait Analysis   Gait Level Of Assist Unable to Participate   Bed Mobility    Supine to Sit Total Assist   Sit to Supine Total Assist   Scooting Total Assist   Rolling Total Assist to Rt.;Total Assist to Lt.   Functional Mobility   Sit to Stand Unable to Participate   Bed, Chair, Wheelchair Transfer Unable to Participate   ICU Target Mobility Level   ICU Mobility - Targeted Level Level 2   How much difficulty does the patient currently have...   Turning over in bed (including adjusting bedclothes, sheets and blankets)? 1   Sitting down on and standing up from a chair with arms (e.g., wheelchair, bedside commode, etc.) 1   Moving from lying on back to sitting on the side of the bed? 1   How much help from another person does the patient currently need...   Moving to and from a bed to a chair (including a wheelchair)? 1   Need to walk in a hospital room? 1   Climbing 3-5 steps with a railing? 1   6 clicks Mobility Score 6   Edema / Skin Assessment   Comments Noted general thin skin with bruising and scratches. Blister near inside corner of R eye, RN aware.   Short Term Goals    Short Term Goal # 1 Pt will perform sup<>sit with min A to progress mobility in 6 visits.   Short Term Goal # 2 Pt will perform functional transfers with min A and FWW to progress mobility in 6 visits.   Short Term Goal # 3 Pt will ambulate 25ft with FWW and min A to progress mobility in 6 visits.   Education Group   Education Provided Role of Physical Therapist   Role of Physical Therapist Patient Response Significant Other;Acceptance;Explanation;Verbal Demonstration   Problem List    Problems Impaired Bed Mobility;Impaired Transfers;Impaired Ambulation;Functional Strength Deficit;Impaired Balance;Impaired Coordination;Decreased Activity Tolerance   Anticipated Discharge Equipment and Recommendations   DC Equipment Recommendations Unable to determine at this time   Discharge Recommendations  Recommend post-acute placement for additional physical therapy services prior to discharge home  (versus home with 24/7 caregiving)   Interdisciplinary Plan of Care Collaboration   IDT Collaboration with  Nursing;Occupational Therapist;Family / Caregiver   Patient Position at End of Therapy In Bed;Wrist Restraints Applied;Call Light within Reach;Tray Table within Reach;Phone within Reach  (mitts applied to B hands)   Collaboration Comments RN updated   Session Information   Date / Session Number  8/25-1(1/3, 8/31)

## 2022-08-25 NOTE — CARE PLAN
The patient is Watcher - Medium risk of patient condition declining or worsening    Shift Goals  Clinical Goals: improve respiratory status, discuss with family  Patient Goals: rest  Family Goals: unable to assess    Progress made toward(s) clinical / shift goals:  Working with RT and MD to improve pulmonary hygiene, mobilizing and NT suctioning as needed. Planning to discuss rounds with family.    Problem: Hemodynamics  Goal: Patient's hemodynamics, fluid balance and neurologic status will be stable or improve  Outcome: Progressing    Patient is not progressing towards the following goals: n/a

## 2022-08-26 ENCOUNTER — APPOINTMENT (OUTPATIENT)
Dept: RADIOLOGY | Facility: MEDICAL CENTER | Age: 72
DRG: 085 | End: 2022-08-26
Attending: SURGERY
Payer: MEDICARE

## 2022-08-26 PROBLEM — Z02.9 DISCHARGE PLANNING ISSUES: Status: ACTIVE | Noted: 2022-08-26

## 2022-08-26 LAB
ALBUMIN SERPL BCP-MCNC: 3.5 G/DL (ref 3.2–4.9)
ALBUMIN/GLOB SERPL: 1.3 G/DL
ALP SERPL-CCNC: 60 U/L (ref 30–99)
ALT SERPL-CCNC: 7 U/L (ref 2–50)
ANION GAP SERPL CALC-SCNC: 9 MMOL/L (ref 7–16)
AST SERPL-CCNC: 17 U/L (ref 12–45)
BASOPHILS # BLD AUTO: 0.5 % (ref 0–1.8)
BASOPHILS # BLD: 0.03 K/UL (ref 0–0.12)
BILIRUB SERPL-MCNC: 0.3 MG/DL (ref 0.1–1.5)
BUN SERPL-MCNC: 27 MG/DL (ref 8–22)
CALCIUM SERPL-MCNC: 8.3 MG/DL (ref 8.5–10.5)
CHLORIDE SERPL-SCNC: 113 MMOL/L (ref 96–112)
CO2 SERPL-SCNC: 23 MMOL/L (ref 20–33)
CREAT SERPL-MCNC: 0.9 MG/DL (ref 0.5–1.4)
EOSINOPHIL # BLD AUTO: 0.36 K/UL (ref 0–0.51)
EOSINOPHIL NFR BLD: 5.5 % (ref 0–6.9)
ERYTHROCYTE [DISTWIDTH] IN BLOOD BY AUTOMATED COUNT: 53.4 FL (ref 35.9–50)
GFR SERPLBLD CREATININE-BSD FMLA CKD-EPI: 90 ML/MIN/1.73 M 2
GLOBULIN SER CALC-MCNC: 2.6 G/DL (ref 1.9–3.5)
GLUCOSE SERPL-MCNC: 128 MG/DL (ref 65–99)
HCT VFR BLD AUTO: 39.1 % (ref 42–52)
HGB BLD-MCNC: 13 G/DL (ref 14–18)
IMM GRANULOCYTES # BLD AUTO: 0.04 K/UL (ref 0–0.11)
IMM GRANULOCYTES NFR BLD AUTO: 0.6 % (ref 0–0.9)
LYMPHOCYTES # BLD AUTO: 1.76 K/UL (ref 1–4.8)
LYMPHOCYTES NFR BLD: 27.1 % (ref 22–41)
MCH RBC QN AUTO: 32.2 PG (ref 27–33)
MCHC RBC AUTO-ENTMCNC: 33.2 G/DL (ref 33.7–35.3)
MCV RBC AUTO: 96.8 FL (ref 81.4–97.8)
MONOCYTES # BLD AUTO: 0.53 K/UL (ref 0–0.85)
MONOCYTES NFR BLD AUTO: 8.2 % (ref 0–13.4)
NEUTROPHILS # BLD AUTO: 3.78 K/UL (ref 1.82–7.42)
NEUTROPHILS NFR BLD: 58.1 % (ref 44–72)
NRBC # BLD AUTO: 0 K/UL
NRBC BLD-RTO: 0 /100 WBC
PLATELET # BLD AUTO: 345 K/UL (ref 164–446)
PMV BLD AUTO: 9 FL (ref 9–12.9)
POTASSIUM SERPL-SCNC: 3.6 MMOL/L (ref 3.6–5.5)
PROT SERPL-MCNC: 6.1 G/DL (ref 6–8.2)
RBC # BLD AUTO: 4.04 M/UL (ref 4.7–6.1)
SODIUM SERPL-SCNC: 145 MMOL/L (ref 135–145)
WBC # BLD AUTO: 6.5 K/UL (ref 4.8–10.8)

## 2022-08-26 PROCEDURE — 71045 X-RAY EXAM CHEST 1 VIEW: CPT

## 2022-08-26 PROCEDURE — A9270 NON-COVERED ITEM OR SERVICE: HCPCS | Performed by: NURSE PRACTITIONER

## 2022-08-26 PROCEDURE — 700101 HCHG RX REV CODE 250: Performed by: SURGERY

## 2022-08-26 PROCEDURE — A9270 NON-COVERED ITEM OR SERVICE: HCPCS | Performed by: SURGERY

## 2022-08-26 PROCEDURE — 700102 HCHG RX REV CODE 250 W/ 637 OVERRIDE(OP): Performed by: NURSE PRACTITIONER

## 2022-08-26 PROCEDURE — 700105 HCHG RX REV CODE 258: Performed by: SURGERY

## 2022-08-26 PROCEDURE — 700111 HCHG RX REV CODE 636 W/ 250 OVERRIDE (IP): Performed by: SURGERY

## 2022-08-26 PROCEDURE — 770001 HCHG ROOM/CARE - MED/SURG/GYN PRIV*

## 2022-08-26 PROCEDURE — 700111 HCHG RX REV CODE 636 W/ 250 OVERRIDE (IP): Performed by: NURSE PRACTITIONER

## 2022-08-26 PROCEDURE — 700102 HCHG RX REV CODE 250 W/ 637 OVERRIDE(OP): Performed by: SURGERY

## 2022-08-26 PROCEDURE — 99233 SBSQ HOSP IP/OBS HIGH 50: CPT | Performed by: SURGERY

## 2022-08-26 PROCEDURE — 80053 COMPREHEN METABOLIC PANEL: CPT

## 2022-08-26 PROCEDURE — 85025 COMPLETE CBC W/AUTO DIFF WBC: CPT

## 2022-08-26 PROCEDURE — 92526 ORAL FUNCTION THERAPY: CPT

## 2022-08-26 RX ORDER — CARBIDOPA/LEVODOPA 25MG-250MG
2 TABLET ORAL EVERY 6 HOURS
Status: DISCONTINUED | OUTPATIENT
Start: 2022-08-26 | End: 2022-08-29 | Stop reason: HOSPADM

## 2022-08-26 RX ORDER — DONEPEZIL HYDROCHLORIDE 5 MG/1
20 TABLET, FILM COATED ORAL EVERY MORNING
Status: DISCONTINUED | OUTPATIENT
Start: 2022-08-27 | End: 2022-08-29 | Stop reason: HOSPADM

## 2022-08-26 RX ORDER — ACETAMINOPHEN 325 MG/1
650 TABLET ORAL EVERY 4 HOURS PRN
Status: DISCONTINUED | OUTPATIENT
Start: 2022-08-26 | End: 2022-08-29 | Stop reason: HOSPADM

## 2022-08-26 RX ORDER — LEVETIRACETAM 500 MG/1
500 TABLET ORAL EVERY 12 HOURS
Status: COMPLETED | OUTPATIENT
Start: 2022-08-26 | End: 2022-08-29

## 2022-08-26 RX ORDER — AMOXICILLIN 250 MG
1 CAPSULE ORAL
Status: DISCONTINUED | OUTPATIENT
Start: 2022-08-26 | End: 2022-08-29 | Stop reason: HOSPADM

## 2022-08-26 RX ORDER — ATROPINE SULFATE 10 MG/ML
2 SOLUTION/ DROPS OPHTHALMIC EVERY 4 HOURS PRN
Status: DISCONTINUED | OUTPATIENT
Start: 2022-08-26 | End: 2022-08-26

## 2022-08-26 RX ORDER — POLYVINYL ALCOHOL 14 MG/ML
2 SOLUTION/ DROPS OPHTHALMIC EVERY 6 HOURS PRN
Status: DISCONTINUED | OUTPATIENT
Start: 2022-08-26 | End: 2022-08-29 | Stop reason: HOSPADM

## 2022-08-26 RX ORDER — CARBIDOPA/LEVODOPA 25MG-250MG
1 TABLET ORAL EVERY 6 HOURS
Status: DISCONTINUED | OUTPATIENT
Start: 2022-08-26 | End: 2022-08-26

## 2022-08-26 RX ORDER — ACETAMINOPHEN 650 MG/1
650 SUPPOSITORY RECTAL EVERY 4 HOURS PRN
Status: DISCONTINUED | OUTPATIENT
Start: 2022-08-26 | End: 2022-08-29 | Stop reason: HOSPADM

## 2022-08-26 RX ORDER — LEVETIRACETAM 500 MG/5ML
500 INJECTION, SOLUTION, CONCENTRATE INTRAVENOUS EVERY 12 HOURS
Status: COMPLETED | OUTPATIENT
Start: 2022-08-26 | End: 2022-08-29

## 2022-08-26 RX ORDER — MORPHINE SULFATE 4 MG/ML
1-5 INJECTION INTRAVENOUS
Status: DISCONTINUED | OUTPATIENT
Start: 2022-08-26 | End: 2022-08-29 | Stop reason: HOSPADM

## 2022-08-26 RX ORDER — ONDANSETRON 2 MG/ML
8 INJECTION INTRAMUSCULAR; INTRAVENOUS EVERY 8 HOURS PRN
Status: DISCONTINUED | OUTPATIENT
Start: 2022-08-26 | End: 2022-08-29 | Stop reason: HOSPADM

## 2022-08-26 RX ORDER — ONDANSETRON 4 MG/1
8 TABLET, ORALLY DISINTEGRATING ORAL EVERY 8 HOURS PRN
Status: DISCONTINUED | OUTPATIENT
Start: 2022-08-26 | End: 2022-08-29 | Stop reason: HOSPADM

## 2022-08-26 RX ORDER — OXYCODONE HYDROCHLORIDE 5 MG/1
2.5 TABLET ORAL
Status: DISCONTINUED | OUTPATIENT
Start: 2022-08-26 | End: 2022-08-29 | Stop reason: HOSPADM

## 2022-08-26 RX ADMIN — CARBIDOPA AND LEVODOPA 2 TABLET: 25; 250 TABLET ORAL at 00:09

## 2022-08-26 RX ADMIN — CARBIDOPA AND LEVODOPA 2 TABLET: 25; 250 TABLET ORAL at 11:15

## 2022-08-26 RX ADMIN — HYDRALAZINE HYDROCHLORIDE 10 MG: 20 INJECTION INTRAMUSCULAR; INTRAVENOUS at 02:18

## 2022-08-26 RX ADMIN — CARBIDOPA AND LEVODOPA 2 TABLET: 25; 250 TABLET ORAL at 05:33

## 2022-08-26 RX ADMIN — ACETAMINOPHEN 650 MG: 325 TABLET ORAL at 11:16

## 2022-08-26 RX ADMIN — Medication 1 EACH: at 11:15

## 2022-08-26 RX ADMIN — LEVETIRACETAM 500 MG: 100 INJECTION, SOLUTION INTRAVENOUS at 18:00

## 2022-08-26 RX ADMIN — ACETAMINOPHEN 650 MG: 325 TABLET ORAL at 00:09

## 2022-08-26 RX ADMIN — ACETAMINOPHEN 650 MG: 325 TABLET ORAL at 05:32

## 2022-08-26 RX ADMIN — DOCUSATE SODIUM 100 MG: 50 LIQUID ORAL at 05:31

## 2022-08-26 RX ADMIN — ENOXAPARIN SODIUM 30 MG: 30 INJECTION SUBCUTANEOUS at 05:31

## 2022-08-26 RX ADMIN — POLYETHYLENE GLYCOL 3350 1 PACKET: 17 POWDER, FOR SOLUTION ORAL at 05:31

## 2022-08-26 RX ADMIN — MORPHINE SULFATE 1 MG: 4 INJECTION INTRAVENOUS at 15:43

## 2022-08-26 RX ADMIN — HYDRALAZINE HYDROCHLORIDE 10 MG: 20 INJECTION INTRAMUSCULAR; INTRAVENOUS at 11:15

## 2022-08-26 RX ADMIN — HYDRALAZINE HYDROCHLORIDE 10 MG: 20 INJECTION INTRAMUSCULAR; INTRAVENOUS at 06:22

## 2022-08-26 RX ADMIN — POTASSIUM BICARBONATE 50 MEQ: 977.5 TABLET, EFFERVESCENT ORAL at 08:06

## 2022-08-26 RX ADMIN — DONEPEZIL HYDROCHLORIDE 20 MG: 5 TABLET, FILM COATED ORAL at 05:32

## 2022-08-26 RX ADMIN — SODIUM CHLORIDE: 9 INJECTION, SOLUTION INTRAVENOUS at 04:30

## 2022-08-26 RX ADMIN — LEVETIRACETAM 500 MG: 500 TABLET, FILM COATED ORAL at 05:32

## 2022-08-26 RX ADMIN — Medication 1 EACH: at 05:32

## 2022-08-26 RX ADMIN — CARBIDOPA AND LEVODOPA 2 TABLET: 25; 250 TABLET ORAL at 18:00

## 2022-08-26 ASSESSMENT — PAIN SCALES - PAIN ASSESSMENT IN ADVANCED DEMENTIA (PAINAD)
TOTALSCORE: 0
BREATHING: NORMAL
BREATHING: NORMAL
FACIALEXPRESSION: SMILING OR INEXPRESSIVE
BODYLANGUAGE: RELAXED
BREATHING: NORMAL
CONSOLABILITY: NO NEED TO CONSOLE
FACIALEXPRESSION: SMILING OR INEXPRESSIVE
BODYLANGUAGE: TENSE, DISTRESSED PACING, FIDGETING
BREATHING: NORMAL
TOTALSCORE: 0
BODYLANGUAGE: RELAXED
CONSOLABILITY: NO NEED TO CONSOLE
CONSOLABILITY: DISTRACTED OR REASSURED BY VOICE/TOUCH
CONSOLABILITY: NO NEED TO CONSOLE
BREATHING: NORMAL
TOTALSCORE: 3
BODYLANGUAGE: RELAXED
BREATHING: NORMAL
CONSOLABILITY: NO NEED TO CONSOLE
BREATHING: NORMAL
FACIALEXPRESSION: SMILING OR INEXPRESSIVE
TOTALSCORE: 0
BREATHING: NORMAL
FACIALEXPRESSION: SMILING OR INEXPRESSIVE
CONSOLABILITY: NO NEED TO CONSOLE
TOTALSCORE: 0
BODYLANGUAGE: RELAXED
FACIALEXPRESSION: SMILING OR INEXPRESSIVE
BODYLANGUAGE: RELAXED
TOTALSCORE: 0
TOTALSCORE: 0
BODYLANGUAGE: TENSE, DISTRESSED PACING, FIDGETING
TOTALSCORE: 1
BODYLANGUAGE: RELAXED
CONSOLABILITY: NO NEED TO CONSOLE
NEGVOCALIZATION: OCCASIONAL MOAN/GROAN, LOW SPEECH, NEGATIVE/DISAPPROVING QUALITY
FACIALEXPRESSION: SMILING OR INEXPRESSIVE
FACIALEXPRESSION: SMILING OR INEXPRESSIVE
CONSOLABILITY: NO NEED TO CONSOLE
FACIALEXPRESSION: SMILING OR INEXPRESSIVE

## 2022-08-26 ASSESSMENT — PAIN DESCRIPTION - PAIN TYPE
TYPE: ACUTE PAIN
TYPE: ACUTE PAIN

## 2022-08-26 NOTE — THERAPY
Speech Language Pathology  Daily Treatment     Patient Name: Garth Gaines  Age:  72 y.o., Sex:  male  Medical Record #: [id number]  Today's Date: 8/26/2022     Precautions  Precautions: Fall Risk, Swallow Precautions ( See Comments), Nasogastric Tube  Comments: B mittens and restraints    H&P  Patient is 72 y.o. male with a diagnosis of GLF, baseline AOx1, Parkinsons disease, dementia. Transfer CT imaging demonstrated to small left frontal lobe intracranial hemorrhages measuring 9 and 3 mm in the greatest diameter. BIG 3. NPO/TF per SLP visit 8/24.    Subjective  Pt awake and lethargic in bed. Good cooperation w/ SLP trials. Initially agreeable to PO and oral care. Poor tolerance. Consistently responsive to voice.    Assessment  SLP provided oral care for patient. Removed min amount of clear, thin secretions from buccal cavities. Removed min amount of thick, mucous secretions from alveloral ridge and upper dentition. Pt w/ significant coughing episode and need for suctioning after oral care. No desaturation. Pt's cough productive, removed mod amount of secretions via oral suction.    Pt agreeable to ice chips. Ice chip x1 administered. Pt w/ greater impairment in oral acceptance from previous sessions. Did not follow cues to close mouth around spoon to accept bolus. Poor containment, adequate mastication. Prolonged, productive cough immediately following ice chip x1. Suctioning provided.     Pt unable to follow commands for dry swallow. Able to follow commands for oral closure, lingual protrusion, lingual lateralization, labial retraction, and labial protrusion. Benefited from tactile cues to reinforce.     Clinical Impressions  Pt continues to present w/ clinical s/sx of oropharyngeal dysphagia, given poor oral acceptance and cough w/ oral care and ice. Pt continues to demonstrate need for tube feeding and NPO status. Recommend frequent oral care and use of ice chips w/ RN to prevent xerostomia and improve  "oropharyngeal sensation.      Recommendations  1.  NPO/TF  2.  Ice chips x5 per hour w/ RN to prevent xerostomia and increase sensation  Supervision: Careful 1:1 hand feeding  Positioning: Fully upright and midline during oral intake              Stop PO ice chips w/ change in respiratory status  3.  Medication: Non Oral  4.  Oral Care: Q4h        Plan    Continue current treatment plan.    Discharge Recommendations: Recommend post-acute placement for additional speech therapy services prior to discharge home    Objective   08/26/22 1216   Precautions   Precautions Fall Risk;Swallow Precautions ( See Comments);Nasogastric Tube   Comments B mittens and restraints   Vitals   O2 (LPM) 2   O2 Delivery Device Oxymask   Pain 0 - 10 Group   Therapist Pain Assessment Post Activity Pain Same as Prior to Activity;0  (No s/sx or report of pain)   Patient / Family Goals   Patient / Family Goal #1 \"I want more ice\"   Goal #1 Outcome Progressing slower than expected   Short Term Goals   Short Term Goal # 1 Pt will consume prefeeding trials without any overt s/sx of aspiration   Goal Outcome # 1 Progressing slower than expected   Education Group   Education Provided Dysphagia   Dysphagia Patient Response Patient;Acceptance;Explanation;Verbal Demonstration;Action Demonstration;Reinforcement Needed   Anticipated Discharge Needs   Discharge Recommendations Recommend post-acute placement for additional speech therapy services prior to discharge home   Therapy Recommendations Upon DC Dysphagia Training;Patient / Family / Caregiver Education   Interdisciplinary Plan of Care Collaboration   IDT Collaboration with  Nursing   Patient Position at End of Therapy In Bed;Bed Alarm On;Wrist Restraints Applied  (Bileratal mitts applied. All verbalized needs met.)   Collaboration Comments RN aware of tx session     "

## 2022-08-26 NOTE — DISCHARGE PLANNING
"Spoke with PC RN Harini and received update from her discussion with spouse & daughter. Family is wanting to transition focus of care to comfort with a plan to get patient home with [facility]. Per Harini, family reports that they have a hospital bed, 24/7 caregivers and additional DME in the home already to assist with hospice.     Dr. Mayer made aware & placing comfort care orders & hospice referral.     [facility] [place] [facility] and [facility]ed to [facility] for processing. [facility] [place] called [facility] (P: [phone]) and spoke with Solomon who reports that they did have patient on service with them at one point. They are agreeable with accepting him back on service and will be waiting for the referral.     Solomon reports that they have limited staff over the weekend and wouldn't be able to see patient on Monday 08/29. Solomon stated that if patient dc home prior to Monday, [facility] team would need to provide his \"palliative care\" meds to ensure he is comfortable until they can see him Monday. Also, needs to ensure that family/caregivers are able to care for the patient safely with medications. Solomon then stated that their hospice team does not have a way to fill meds on the weekend so Monday is when they can start overseeing pt's medications. Overall, hospice will see patient on Monday.     If needs arise over the weekend or if pt dc home on the weekend please call [facility] on call number to notify. (P: [phone]).     Spoke with daughter and updated her. Family is agreeable and thankful.     Plan: [facility] accepting patient on service. They can see patient at home on Monday 08/29.     Care Transition Team Discharge Planning    Anticipated Discharge Information  Discharge Disposition: D/T to hospice home     "

## 2022-08-26 NOTE — CARE PLAN
The patient is Watcher - Medium risk of patient condition declining or worsening    Shift Goals  Clinical Goals: Monitor O2 and neuro status  Patient Goals: rest    Progress made toward(s) clinical / shift goals:    Q2hr restraint monitoring in place. No s/s of distress or discomfort. Covid precautions in place.   Problem: Knowledge Deficit - Standard  Goal: Patient and family/care givers will demonstrate understanding of plan of care, disease process/condition, diagnostic tests and medications  Outcome: Progressing     Problem: Skin Integrity  Goal: Skin integrity is maintained or improved  Outcome: Progressing     Problem: Fall Risk  Goal: Patient will remain free from falls  Outcome: Progressing     Problem: Safety - Medical Restraint  Goal: Remains free of injury from restraints (Restraint for Interference with Medical Device)  Outcome: Progressing     Problem: Safety - Medical Restraint  Goal: Free from restraint(s) (Restraint for Interference with Medical Device)  Outcome: Progressing     Problem: Neuro Status  Goal: Neuro status will remain stable or improve  Outcome: Progressing

## 2022-08-26 NOTE — PROGRESS NOTES
Trauma / Surgical Daily Progress Note    Date of Service  8/26/2022    Chief Complaint  72 y.o. male admitted 8/22/2022 with GLF, head bleed    Interval Events  Hospital day #5  Pt currently requires ICU care and hospital admission  Seen on rounds and discussed with multidisciplinary team  Injuries and physiologic derangements result in significant risk of long term morbidity  Events and interventions include:  Integration of multiple data points and associated complex medical decisions    Baseline dementia without change  Ongoing pulmonary toilet  Now DNR/DNI  Ongoing nutrition    Review of Systems  Review of Systems   Unable to perform ROS: Dementia      Vital Signs for last 24 hours  Temp:  [36.2 °C (97.1 °F)-37.3 °C (99.2 °F)] 36.4 °C (97.5 °F)  Pulse:  [40-71] 71  Resp:  [14-37] 18  BP: (114-181)/(66-93) 158/80  SpO2:  [78 %-100 %] 100 %    Hemodynamic parameters for last 24 hours       Respiratory Data     Respiration: 18, Pulse Oximetry: 100 %     Work Of Breathing / Effort: Within Normal Limits  RUL Breath Sounds: Rhonchi, RML Breath Sounds: Rhonchi, RLL Breath Sounds: Diminished, ROSITA Breath Sounds: Rhonchi, LLL Breath Sounds: Diminished    Physical Exam  Physical Exam  Vitals reviewed.   HENT:      Head: Normocephalic.   Eyes:      General: No scleral icterus.     Pupils: Pupils are equal, round, and reactive to light.   Cardiovascular:      Rate and Rhythm: Normal rate.      Pulses: Normal pulses.      Heart sounds: Normal heart sounds.   Pulmonary:      Effort: Pulmonary effort is normal.   Abdominal:      Palpations: Abdomen is soft.   Musculoskeletal:         General: Normal range of motion.      Cervical back: Normal range of motion.   Skin:     General: Skin is warm and dry.      Capillary Refill: Capillary refill takes less than 2 seconds.   Neurological:      General: No focal deficit present.      Mental Status: He is alert. Mental status is at baseline.   Psychiatric:      Comments: Unable to  assess       Laboratory  Recent Results (from the past 24 hour(s))   CBC with Differential: Tomorrow AM    Collection Time: 08/26/22  4:23 AM   Result Value Ref Range    WBC 6.5 4.8 - 10.8 K/uL    RBC 4.04 (L) 4.70 - 6.10 M/uL    Hemoglobin 13.0 (L) 14.0 - 18.0 g/dL    Hematocrit 39.1 (L) 42.0 - 52.0 %    MCV 96.8 81.4 - 97.8 fL    MCH 32.2 27.0 - 33.0 pg    MCHC 33.2 (L) 33.7 - 35.3 g/dL    RDW 53.4 (H) 35.9 - 50.0 fL    Platelet Count 345 164 - 446 K/uL    MPV 9.0 9.0 - 12.9 fL    Neutrophils-Polys 58.10 44.00 - 72.00 %    Lymphocytes 27.10 22.00 - 41.00 %    Monocytes 8.20 0.00 - 13.40 %    Eosinophils 5.50 0.00 - 6.90 %    Basophils 0.50 0.00 - 1.80 %    Immature Granulocytes 0.60 0.00 - 0.90 %    Nucleated RBC 0.00 /100 WBC    Neutrophils (Absolute) 3.78 1.82 - 7.42 K/uL    Lymphs (Absolute) 1.76 1.00 - 4.80 K/uL    Monos (Absolute) 0.53 0.00 - 0.85 K/uL    Eos (Absolute) 0.36 0.00 - 0.51 K/uL    Baso (Absolute) 0.03 0.00 - 0.12 K/uL    Immature Granulocytes (abs) 0.04 0.00 - 0.11 K/uL    NRBC (Absolute) 0.00 K/uL   Comp Metabolic Panel (CMP): Tomorrow AM    Collection Time: 08/26/22  4:23 AM   Result Value Ref Range    Sodium 145 135 - 145 mmol/L    Potassium 3.6 3.6 - 5.5 mmol/L    Chloride 113 (H) 96 - 112 mmol/L    Co2 23 20 - 33 mmol/L    Anion Gap 9.0 7.0 - 16.0    Glucose 128 (H) 65 - 99 mg/dL    Bun 27 (H) 8 - 22 mg/dL    Creatinine 0.90 0.50 - 1.40 mg/dL    Calcium 8.3 (L) 8.5 - 10.5 mg/dL    AST(SGOT) 17 12 - 45 U/L    ALT(SGPT) 7 2 - 50 U/L    Alkaline Phosphatase 60 30 - 99 U/L    Total Bilirubin 0.3 0.1 - 1.5 mg/dL    Albumin 3.5 3.2 - 4.9 g/dL    Total Protein 6.1 6.0 - 8.2 g/dL    Globulin 2.6 1.9 - 3.5 g/dL    A-G Ratio 1.3 g/dL   ESTIMATED GFR    Collection Time: 08/26/22  4:23 AM   Result Value Ref Range    GFR (CKD-EPI) 90 >60 mL/min/1.73 m 2       Fluids    Intake/Output Summary (Last 24 hours) at 8/26/2022 1418  Last data filed at 8/26/2022 1200  Gross per 24 hour   Intake 2471 ml   Output  375 ml   Net 2096 ml       Core Measures & Quality Metrics  EKG reviewed and Labs reviewed  Salazar catheter: Critically Ill - Requiring Accurate Measurement of Urinary Output      DVT Prophylaxis: Enoxaparin (Lovenox)  DVT prophylaxis - mechanical: SCDs        RAP Score Total: 6  CAGE Results: not completed Blood Alcohol>0.08: not completed     Assessment/Plan  Intraparenchymal hematoma of brain due to trauma without loss of consciousness, initial encounter (HCC)- (present on admission)  Assessment & Plan  Transfer CT imaging demonstrated to small left frontal lobe intracranial hemorrhages measuring 9 and 3 mm in the greatest diameter. BIG 3.  Repeat interval CT imaging of the brain stable.  Non-operative management.   Post traumatic pharmacologic seizure prophylaxis for 1 week.  Speech Language Pathology cognitive evaluation.  8/28:  GCS 13. This may be baseline.  Advanced directive.  Family conference to DNR/DNI.  Didier Banegas MD. Neurosurgeon. [facility].     Dysphagia- (present on admission)  Assessment & Plan  8/23 Failed swallow evaluation.  NPO with consideration for non-oral nutrition source.  8/24 Failed swallow evaluation.  Cortrak placed.     Lab test positive for detection of COVID-19 virus- (present on admission)  Assessment & Plan  Exposure from family member  Positive home test on 8/17, minimal symptoms.  Per Infectious Disease may come out of isolation on 8/28.   Isolation precautions     Dementia (HCC)- (present on admission)  Assessment & Plan  Chronic condition treated with donepezil. Baseline A&O x 1 reported by EMS.  8/24 Resumed maintenance medication.  8/24 Palliative care consult for advance care planning.  Patient lives at home with wife and caregiver.    Parkinson disease (HCC)- (present on admission)  Assessment & Plan  Chronic condition treated with donepezil, carbidopa/sinemet.  8/24 Resumed maintenance medication.    Scalp laceration, initial encounter- (present on  admission)  Assessment & Plan  Left frontal contusion and scalp laceration.  Sutured repair at the referring facility.    No contraindication to deep vein thrombosis (DVT) prophylaxis- (present on admission)  Assessment & Plan  Prophylactic anticoagulation for thrombotic prevention initially contraindicated secondary to elevated bleeding risk.  8/23 Prophylactic dose enoxaparin initiated.   Systemic anticoagulation approved by Neurosurgery.    Trauma- (present on admission)  Assessment & Plan  Mechanical ground-level fall.  BIG 3 intracranial hemorrhage.  Trauma Green Transfer Activation.  Praveen Clemens MD. Trauma Surgery.         Discussed patient condition with Family, RN, RT, Pharmacy, Dietary, and .

## 2022-08-26 NOTE — CARE PLAN
The patient is Watcher - Medium risk of patient condition declining or worsening    Shift Goals  Clinical Goals: monitor oxygen, neuro status  Patient Goals: rest  Family Goals: updates    Progress made toward(s) clinical / shift goals:  Promoting pulmonary hygiene with RT, mobility, suctioning, chest percussion. Neuro status documented via flowsheets.  Problem: Neuro Status  Goal: Neuro status will remain stable or improve  Outcome: Progressing     Problem: Hemodynamics  Goal: Patient's hemodynamics, fluid balance and neurologic status will be stable or improve  Outcome: Progressing    Patient is not progressing towards the following goals: n/a

## 2022-08-26 NOTE — CONSULTS
Reason for PC Consult: Advance Care Planning    Consulted by: NEETU Mayes    Assessment:  General: The patient is a 72 year-old elderly man who was injured in a fall. The patient suffered a mechanical ground-level fall. The patient had an unknown loss of consciousness. The patient denies any chronic anticoagulation or antiplatelet medications. The patient is unable to articulate any subjective complaints.    Social: The patient lives in [facility].  His Wife Margaret and daughter Cookie are his support. He has a full-time caretaker who assists with compromised activities of daily living secondary to Parkinson's disease.       Consults: Surgery Neurology    Dyspnea: No-    Last BM: 08/26/22-    Pain: No-    Depression: No-    Dementia: No;       Spiritual:  Is Temple or spirituality important for coping with this illness? Unable to determine-    Has a  or spiritual provider visit been requested? Unable to determine    Palliative Performance Scale: 40%    Advance Directive: Advance Directive-  see below   DPOA:  wife DPOA Margaret Gaines  POLST: yes received records form [facility] DNR/DNI     Code Status: DNR-      Outcome:  Met with Eliza  in SICU appreciate the updates. Introduced self and role of Palliative care via phone to pt's wife Margaret. She and her daughter will be at the hospital at 1400 and will meet with them at bedside. Assessed pt's wife's understanding of pt's current medical status, overall janice picture, and options for future care. She stated that they have been working with the palliative outpt team with [facility] and have sent over a referral to [facility] hospice, feeling he was ready for hospice services. Pt has had issues with aspiration in the past per Margaret. He loves to eat and has been asking for lemonade today. He has a hospital bed with a suction machine and oxygen at home. She also stated that she is set up with caregivers to help support  her. She wants Garth to be as comfortable as possible. It is important for them to have him home in his own surroundings.     PC RN reached out [facility] and records for Garth's AD and POLST form faxed over. He signed DNR/DNI on the POLST this was completed 2021 by DR. Barbosa. He also Appointed Margaret Liana as his D-POA and Cookie Daniels as his first co alternative agent along with Roopa Gaines as first alternate co agent. He has selected wanting hospice care if he is terminally ill and it is feasible along with not wanting to prolong his life beyond a reasonable time if he has a incurable and irreversible condition that will result in his death. Margaret wants to respect Garth's wishes and work on getting him home and focusing on his comfort. PC RN explained comfort care the the philosophy. She also agreed while he is here in the hospital for Garth to drink and eat despite risk and to change him over to comfort care. We chatted about the different orders and what he is able to tolerate for pain or symptom management. He is not able to tolerate ativan or valium nor atropine. He has a issues with the anticholinergic effects form the Atropine. Stated that I will communicate this to his MD when changing the orders. Communicated with Eliza who will communicate with [facility] and try and work on a D/C plan as soon as possible. Provide active listening, reflection, validation and normalization and empathic support provided throughout the encounter. All questions answered and PC contact information provided.     Updated: Dr. Mayer, JUDE and Eliza     Plan: change over to comfort care work on d/c plan to transfer home with [facility] when they are able to accept. POLST and AD will be scanned into chart.     Thank you for allowing Palliative Care to participate in this patient's care. Please feel free to call [phone] with any questions or concerns.

## 2022-08-26 NOTE — DOCUMENTATION QUERY
[facility]                                                                       Query Response Note      PATIENT:               SEBASTIAN SHELTON  ACCT #:                  [id number]  MRN:                     [id number]  :                      1950  ADMIT DATE:       2022 1:46 PM  DISCH DATE:          RESPONDING  PROVIDER #:        [id number]           QUERY TEXT:    71 y/o male presented after a GLF and found to have an intraparenchymal hematoma.   on the day of admission BP's:  185/103, 207/99, 218/100, 214/98.  Treatment - IV Hydralazine.  Based of the diagnostic and clinical criteria, can a diagnosis be made?    NOTE:  If an appropriate response is not listed below, please respond with a new note.      The patient's clinical indicators include:   BP's:  185/103, 207/99, 218/100, 214/98    Treatment - IV Hydralazine    Risk factors - Hypertension    Thank you,  Bina Pandey BSN  Clinical   Connect via "[employer]"  Options provided:   -- Hypertensive urgency is present on admission   -- Other explanation, (please specify the other explanation)   -- Unable to Determine      Query created by: Bina Pandey on 2022 4:57 PM    RESPONSE TEXT:    Unable to Determine          Electronically signed by:  ELIJAH BORREGO MD 2022 6:24 PM

## 2022-08-27 PROCEDURE — 99231 SBSQ HOSP IP/OBS SF/LOW 25: CPT | Performed by: NURSE PRACTITIONER

## 2022-08-27 PROCEDURE — 700102 HCHG RX REV CODE 250 W/ 637 OVERRIDE(OP): Performed by: SURGERY

## 2022-08-27 PROCEDURE — A9270 NON-COVERED ITEM OR SERVICE: HCPCS | Performed by: SURGERY

## 2022-08-27 PROCEDURE — 700111 HCHG RX REV CODE 636 W/ 250 OVERRIDE (IP): Performed by: SURGERY

## 2022-08-27 PROCEDURE — 770001 HCHG ROOM/CARE - MED/SURG/GYN PRIV*

## 2022-08-27 RX ADMIN — LEVETIRACETAM 500 MG: 100 INJECTION, SOLUTION INTRAVENOUS at 17:22

## 2022-08-27 RX ADMIN — LEVETIRACETAM 500 MG: 100 INJECTION, SOLUTION INTRAVENOUS at 04:36

## 2022-08-27 RX ADMIN — CARBIDOPA AND LEVODOPA 2 TABLET: 25; 250 TABLET ORAL at 17:22

## 2022-08-27 RX ADMIN — MORPHINE SULFATE 3 MG: 4 INJECTION INTRAVENOUS at 11:19

## 2022-08-27 ASSESSMENT — PAIN SCALES - PAIN ASSESSMENT IN ADVANCED DEMENTIA (PAINAD)
NEGVOCALIZATION: OCCASIONAL MOAN/GROAN, LOW SPEECH, NEGATIVE/DISAPPROVING QUALITY
FACIALEXPRESSION: FACIAL GRIMACING
BODYLANGUAGE: TENSE, DISTRESSED PACING, FIDGETING
BREATHING: OCCASIONAL LABORED BREATHING, SHORT PERIOD OF HYPERVENTILATION
TOTALSCORE: 6
CONSOLABILITY: DISTRACTED OR REASSURED BY VOICE/TOUCH

## 2022-08-27 ASSESSMENT — PAIN DESCRIPTION - PAIN TYPE
TYPE: ACUTE PAIN
TYPE: ACUTE PAIN

## 2022-08-27 ASSESSMENT — PATIENT HEALTH QUESTIONNAIRE - PHQ9
SUM OF ALL RESPONSES TO PHQ9 QUESTIONS 1 AND 2: 0
2. FEELING DOWN, DEPRESSED, IRRITABLE, OR HOPELESS: NOT AT ALL
1. LITTLE INTEREST OR PLEASURE IN DOING THINGS: NOT AT ALL

## 2022-08-27 NOTE — CARE PLAN
The patient is Stable - Low risk of patient condition declining or worsening    Shift Goals  Clinical Goals: comfort care  Patient Goals: rest  Family Goals: ANDRIY    Progress made toward(s) clinical / shift goals:    Problem: Knowledge Deficit - Standard  Goal: Patient and family/care givers will demonstrate understanding of plan of care, disease process/condition, diagnostic tests and medications  Outcome: Progressing     Problem: Pain - Standard  Goal: Alleviation of pain or a reduction in pain to the patient’s comfort goal  Outcome: Progressing     Problem: Skin Integrity  Goal: Skin integrity is maintained or improved  Outcome: Progressing

## 2022-08-27 NOTE — PROGRESS NOTES
Report received from Meri ROQUE from SICU and patient care assumed. Patient transferred from SICU in bed. A&Ox1, with no complaints of pain, and is on RA. Patient sounding slightly gurgley, orally suctioned. Patient comfort measures only at this time. Patient cleaned and repositioned. All fall precautions are in place, belongings at bedside table. Pt was updated on POC, no questions or concerns. Pt educated on use of call light for assistance.

## 2022-08-27 NOTE — PROGRESS NOTES
Trauma / Surgical Daily Progress Note    Date of Service  8/27/2022    Chief Complaint  72 y.o. male admitted 8/22/2022 with GLF, head bleed    Interval Events    Patient resting comfortably with comfort care orders in place.    - Medically cleared for discharge when outpatient hospice arranged.    Review of Systems  Review of Systems   Unable to perform ROS: Dementia      Vital Signs  Temp:  [36.2 °C (97.2 °F)-37.1 °C (98.7 °F)] 37.1 °C (98.7 °F)  Pulse:  [55-78] 62  Resp:  [12-25] 16  BP: (122-187)/(58-97) 157/80  SpO2:  [95 %-100 %] 95 %    Physical Exam  Physical Exam  Vitals reviewed.   HENT:      Head: Normocephalic.   Eyes:      General: No scleral icterus.     Pupils: Pupils are equal, round, and reactive to light.   Cardiovascular:      Rate and Rhythm: Normal rate.      Pulses: Normal pulses.      Heart sounds: Normal heart sounds.   Pulmonary:      Effort: Pulmonary effort is normal.   Abdominal:      Palpations: Abdomen is soft.   Musculoskeletal:         General: Normal range of motion.      Cervical back: Normal range of motion.   Skin:     General: Skin is warm and dry.      Capillary Refill: Capillary refill takes less than 2 seconds.   Neurological:      General: No focal deficit present.      Mental Status: He is alert. Mental status is at baseline.   Psychiatric:      Comments: Unable to assess       Laboratory  No results found for this or any previous visit (from the past 24 hour(s)).    Fluids    Intake/Output Summary (Last 24 hours) at 8/27/2022 0947  Last data filed at 8/26/2022 2000  Gross per 24 hour   Intake 969 ml   Output 1050 ml   Net -81 ml       Core Measures & Quality Metrics  Core Measures & Quality Metrics  RAP Score Total: 6  CAGE Results: not completed Blood Alcohol>0.08: not completed     Assessment/Plan  Discharge planning issues- (present on admission)  Assessment & Plan  8/26 Comfort care measures.  Hospice referral placed.    Intraparenchymal hematoma of brain due to  trauma without loss of consciousness, initial encounter (HCC)- (present on admission)  Assessment & Plan  Transfer CT imaging demonstrated to small left frontal lobe intracranial hemorrhages measuring 9 and 3 mm in the greatest diameter. BIG 3.  Repeat interval CT imaging of the brain stable.  Non-operative management.   Post traumatic pharmacologic seizure prophylaxis for 1 week.  Speech Language Pathology cognitive evaluation.  8/28:  GCS 13. This may be baseline.  Advanced directive.  Family conference to DNR/DNI.  Didier Banegas MD. Neurosurgeon. [facility].     Dysphagia- (present on admission)  Assessment & Plan  8/23 Failed swallow evaluation.  NPO with consideration for non-oral nutrition source.  8/24 Failed swallow evaluation.  Cortrak placed.   8/26 Diet as tolerated.  Comfort Care measures.     Lab test positive for detection of COVID-19 virus- (present on admission)  Assessment & Plan  Exposure from family member  Positive home test on 8/17, minimal symptoms.  Per Infectious Disease may come out of isolation on 8/28.   Isolation precautions    Dementia (HCC)- (present on admission)  Assessment & Plan  Chronic condition treated with donepezil. Baseline A&O x 1 reported by EMS.  8/24 Resumed maintenance medication.  8/24 Palliative care consult for advance care planning.  Patient lives at home with wife and caregiver.    Parkinson disease (HCC)- (present on admission)  Assessment & Plan  Chronic condition treated with donepezil, carbidopa/sinemet.  8/24 Resumed maintenance medication.     Scalp laceration, initial encounter- (present on admission)  Assessment & Plan  Left frontal contusion and scalp laceration.  Sutured repair at the referring facility.    No contraindication to deep vein thrombosis (DVT) prophylaxis- (present on admission)  Assessment & Plan  Prophylactic anticoagulation for thrombotic prevention initially contraindicated secondary to elevated bleeding risk.  8/23 Prophylactic dose  enoxaparin initiated.   Systemic anticoagulation approved by Neurosurgery.   8/26 Pharmacological DVT prophylaxis stopped, Comfort care measures.     Trauma- (present on admission)  Assessment & Plan  Mechanical ground-level fall.  BIG 3 intracranial hemorrhage.  Trauma Green Transfer Activation.  Praveen Clemens MD. Trauma Surgery.       Discussed patient condition with Patient and trauma surgery, Dr. Praveen Clemens.

## 2022-08-27 NOTE — CARE PLAN
The patient is Stable - Low risk of patient condition declining or worsening    Shift Goals  Clinical Goals: comfort care with transition to home hospice  Patient Goals: rest, comfort  Family Goals: NA    Progress made toward(s) clinical / shift goals:    Problem: Pain - Standard  Goal: Alleviation of pain or a reduction in pain to the patient’s comfort goal  Outcome: Progressing   Patient w/o reports of pain. PRN pain meds available if needed. Patient comfort care at this time.   Problem: Skin Integrity  Goal: Skin integrity is maintained or improved  Outcome: Progressing   Appropriate interventions in place to protect skin. Pt on q2 turns, dry flows in place, with condom cath on for urinary elimination. Bed changes PRN. Heels floated, extra pillows for positioning. Wound on board.    Problem: Fall Risk  Goal: Patient will remain free from falls  Outcome: Progressing   Safety and fall education given. All fall precautions are in place with belongings at bedside table. Needs are tended to. Educated to use call light for assistance. Pt verbalized understanding.      Patient is not progressing towards the following goals:    Problem: Neuro Status  Goal: Neuro status will remain stable or improve  Outcome: Not Progressing

## 2022-08-27 NOTE — PROGRESS NOTES
4 Eyes Skin Assessment Completed by Margaret RN and Dalia RN.    Head Scab, Bruising, Swelling, and Incision  Ears WDL  Nose WDL  Mouth WDL  Neck WDL  Breast/Chest WDL  Shoulder Blades WDL  Spine WDL  (R) Arm/Elbow/Hand Bruising  (L) Arm/Elbow/Hand Bruising  Abdomen WDL  Groin Redness  Scrotum/Coccyx/Buttocks Redness, stage 2 pressure injury  (R) Leg Bruising  (L) Leg Bruising  (R) Heel/Foot/Toe WDL  (L) Heel/Foot/Toe WDL          Devices In Places Condom Cath      Interventions In Place Heel Mepilex, Heel Float Boots, Waffle Overlay, Q2 Turns, Barrier Cream, Heels Loaded W/Pillows, and Pressure Redistribution Mattress    Possible Skin Injury Yes    Pictures Uploaded Into Epic N/A  Wound Consult Placed N/A  RN Wound Prevention Protocol Ordered Yes

## 2022-08-28 PROCEDURE — 770001 HCHG ROOM/CARE - MED/SURG/GYN PRIV*

## 2022-08-28 PROCEDURE — 99231 SBSQ HOSP IP/OBS SF/LOW 25: CPT | Performed by: NURSE PRACTITIONER

## 2022-08-28 PROCEDURE — A9270 NON-COVERED ITEM OR SERVICE: HCPCS | Performed by: SURGERY

## 2022-08-28 PROCEDURE — 700111 HCHG RX REV CODE 636 W/ 250 OVERRIDE (IP): Performed by: SURGERY

## 2022-08-28 PROCEDURE — 700102 HCHG RX REV CODE 250 W/ 637 OVERRIDE(OP): Performed by: SURGERY

## 2022-08-28 RX ADMIN — LEVETIRACETAM 500 MG: 100 INJECTION, SOLUTION INTRAVENOUS at 05:06

## 2022-08-28 RX ADMIN — LEVETIRACETAM 500 MG: 500 TABLET, FILM COATED ORAL at 18:11

## 2022-08-28 RX ADMIN — CARBIDOPA AND LEVODOPA 2 TABLET: 25; 250 TABLET ORAL at 13:53

## 2022-08-28 RX ADMIN — CARBIDOPA AND LEVODOPA 2 TABLET: 25; 250 TABLET ORAL at 18:11

## 2022-08-28 ASSESSMENT — COGNITIVE AND FUNCTIONAL STATUS - GENERAL
MOVING TO AND FROM BED TO CHAIR: UNABLE
PERSONAL GROOMING: TOTAL
TURNING FROM BACK TO SIDE WHILE IN FLAT BAD: UNABLE
DRESSING REGULAR LOWER BODY CLOTHING: TOTAL
STANDING UP FROM CHAIR USING ARMS: TOTAL
TOILETING: TOTAL
EATING MEALS: TOTAL
DRESSING REGULAR UPPER BODY CLOTHING: TOTAL
MOBILITY SCORE: 6
WALKING IN HOSPITAL ROOM: TOTAL
DAILY ACTIVITIY SCORE: 6
SUGGESTED CMS G CODE MODIFIER MOBILITY: CN
SUGGESTED CMS G CODE MODIFIER DAILY ACTIVITY: CN
CLIMB 3 TO 5 STEPS WITH RAILING: TOTAL
MOVING FROM LYING ON BACK TO SITTING ON SIDE OF FLAT BED: UNABLE
HELP NEEDED FOR BATHING: TOTAL

## 2022-08-28 ASSESSMENT — PAIN DESCRIPTION - PAIN TYPE: TYPE: ACUTE PAIN

## 2022-08-28 NOTE — CARE PLAN
The patient is Stable - Low risk of patient condition declining or worsening    Shift Goals  Clinical Goals: comfort care  Patient Goals: Food  Family Goals: nutrition     Progress made toward(s) clinical / shift goals:  patient tolerating regular diet.    Patient is not progressing towards the following goals:

## 2022-08-28 NOTE — PROGRESS NOTES
Trauma / Surgical Daily Progress Note    Date of Service  8/28/2022    Chief Complaint  72 y.o. male with a history of dementia and Parkinson diease who was admitted 8/22/2022 with intracranial hemorrhages status post ground level fall.     8/26 Comfort Care measures initiated.     Interval Events    Resting comfortably in bed with wife at bedside.    - Discharge when outpatient hospice care arranged.  - Pt and pt's wife counseled.     Review of Systems  Review of Systems   Unable to perform ROS: Dementia      Vital Signs  Temp:  [36.7 °C (98 °F)-36.9 °C (98.5 °F)] 36.9 °C (98.5 °F)  Pulse:  [67] 67  Resp:  [17] 17  BP: (178-184)/(84-97) 184/84  SpO2:  [94 %-96 %] 96 %    Physical Exam  Physical Exam  Vitals reviewed.   HENT:      Head:      Comments: Evolving left facial ecchymosis / edema.  Eyes:      General: No scleral icterus.     Pupils: Pupils are equal, round, and reactive to light.      Comments: Left periorbital edema.   Cardiovascular:      Rate and Rhythm: Normal rate.      Pulses: Normal pulses.      Heart sounds: Normal heart sounds.   Pulmonary:      Effort: Pulmonary effort is normal.   Abdominal:      Palpations: Abdomen is soft.   Musculoskeletal:         General: Normal range of motion.      Cervical back: Normal range of motion.   Skin:     General: Skin is warm and dry.      Capillary Refill: Capillary refill takes less than 2 seconds.   Neurological:      General: No focal deficit present.      Mental Status: He is alert. Mental status is at baseline.   Psychiatric:      Comments: Unable to assess       Laboratory  No results found for this or any previous visit (from the past 24 hour(s)).    Fluids    Intake/Output Summary (Last 24 hours) at 8/28/2022 1416  Last data filed at 8/28/2022 1300  Gross per 24 hour   Intake 460 ml   Output 750 ml   Net -290 ml       Core Measures & Quality Metrics    Salazar catheter: No Salazar      DVT: Comfort care.    Ulcer Prophylaxis: Comfort care.    :  Arranging outpatient hospice care.  RAP Score Total: 6  CAGE Results: not completed Blood Alcohol>0.08: not completed     Assessment/Plan  Discharge planning issues- (present on admission)  Assessment & Plan  8/26 Comfort care measures.  Hospice referral placed.    Intraparenchymal hematoma of brain due to trauma without loss of consciousness, initial encounter (HCC)- (present on admission)  Assessment & Plan  Transfer CT imaging demonstrated to small left frontal lobe intracranial hemorrhages measuring 9 and 3 mm in the greatest diameter. BIG 3.  Repeat interval CT imaging of the brain stable.  Non-operative management.   Post traumatic pharmacologic seizure prophylaxis for 1 week.  Speech Language Pathology cognitive evaluation.  8/28:  GCS 13. This may be baseline.  Advanced directive.  Family conference to DNR/DNI.  Didier Banegas MD. Neurosurgeon. [facility] [place].     Dysphagia- (present on admission)  Assessment & Plan  8/23 Failed swallow evaluation.  NPO with consideration for non-oral nutrition source.  8/24 Failed swallow evaluation.  Cortrak placed.   8/26 Diet as tolerated.  Comfort Care measures.     Lab test positive for detection of COVID-19 virus- (present on admission)  Assessment & Plan  Exposure from family member  Positive home test on 8/17, minimal symptoms.  Per Infectious Disease may come out of isolation on 8/28.   Isolation precautions    Dementia (HCC)- (present on admission)  Assessment & Plan  Chronic condition treated with donepezil. Baseline A&O x 1 reported by EMS.  8/24 Resumed maintenance medication.  8/24 Palliative care consult for advance care planning.  Patient lives at home with wife and caregiver.    Parkinson disease (HCC)- (present on admission)  Assessment & Plan  Chronic condition treated with donepezil, carbidopa/sinemet.  8/24 Resumed maintenance medication.     Scalp laceration, initial encounter- (present on admission)  Assessment & Plan  Left frontal contusion and scalp  laceration.  Sutured repair at the referring facility.    No contraindication to deep vein thrombosis (DVT) prophylaxis- (present on admission)  Assessment & Plan  Prophylactic anticoagulation for thrombotic prevention initially contraindicated secondary to elevated bleeding risk.  8/23 Prophylactic dose enoxaparin initiated.   Systemic anticoagulation approved by Neurosurgery.   8/26 Pharmacological DVT prophylaxis stopped, Comfort care measures.     Trauma- (present on admission)  Assessment & Plan  Mechanical ground-level fall.  BIG 3 intracranial hemorrhage.  Trauma Green Transfer Activation.  Praveen Clemens MD. Trauma Surgery.         Discussed patient condition with Patient and trauma surgery, Dr. Praveen Clemens.

## 2022-08-28 NOTE — CARE PLAN
The patient is on Comfort Measures    Shift Goals  Clinical Goals: Comfort Care, Pain Management  Patient Goals: ANDRIY  Family Goals: ANDRIY    Progress made toward(s) clinical / shift goals:  Remains comfortable without signs of pain or distress

## 2022-08-29 VITALS
WEIGHT: 134.92 LBS | DIASTOLIC BLOOD PRESSURE: 73 MMHG | HEIGHT: 70 IN | SYSTOLIC BLOOD PRESSURE: 100 MMHG | TEMPERATURE: 97.5 F | BODY MASS INDEX: 19.32 KG/M2 | OXYGEN SATURATION: 96 % | HEART RATE: 65 BPM | RESPIRATION RATE: 17 BRPM

## 2022-08-29 PROCEDURE — 99239 HOSP IP/OBS DSCHRG MGMT >30: CPT | Performed by: NURSE PRACTITIONER

## 2022-08-29 PROCEDURE — 700102 HCHG RX REV CODE 250 W/ 637 OVERRIDE(OP): Performed by: SURGERY

## 2022-08-29 PROCEDURE — A9270 NON-COVERED ITEM OR SERVICE: HCPCS | Performed by: SURGERY

## 2022-08-29 RX ADMIN — CARBIDOPA AND LEVODOPA 2 TABLET: 25; 250 TABLET ORAL at 04:17

## 2022-08-29 RX ADMIN — LEVETIRACETAM 500 MG: 500 TABLET, FILM COATED ORAL at 04:16

## 2022-08-29 RX ADMIN — DONEPEZIL HYDROCHLORIDE 20 MG: 5 TABLET, FILM COATED ORAL at 04:16

## 2022-08-29 RX ADMIN — CARBIDOPA AND LEVODOPA 2 TABLET: 25; 250 TABLET ORAL at 13:30

## 2022-08-29 RX ADMIN — CARBIDOPA AND LEVODOPA 2 TABLET: 25; 250 TABLET ORAL at 00:26

## 2022-08-29 NOTE — DIETARY
Nutrition Services: Brief Update    RD previously following pt for tube feeding.  Pt transitioned to comfort care 8/26, cortrak subsequently removed.  Diet advanced to regular.  RD will continue to follow per department guidelines.     RD available PRN.

## 2022-08-29 NOTE — DISCHARGE SUMMARY
Trauma Discharge Summary    DATE OF ADMISSION: 8/22/2022    DATE OF DISCHARGE: 8/29/2022    LENGTH OF STAY: 7 days     ATTENDING PHYSICIAN: Praveen Clemens M.D.    CONSULTING PROVIDERS:   1. Didier Banegas III, neurosurgery.  2. Hairni Baker R.N., palliative care.       DISCHARGE DIAGNOSIS:  Active Problems:    Intraparenchymal hematoma of brain due to trauma without loss of consciousness, initial encounter ([facility]) POA: Yes    Discharge planning issues POA: Yes    Parkinson disease (HCC) POA: Yes    Dementia (HCC) POA: Yes    Lab test positive for detection of COVID-19 virus POA: Yes    Dysphagia POA: Yes    Trauma POA: Yes    No contraindication to deep vein thrombosis (DVT) prophylaxis POA: Yes    Scalp laceration, initial encounter POA: Yes  Resolved Problems:    * No resolved hospital problems. *      PROCEDURES:  1. None    HISTORY OF PRESENT ILLNESS: The patient is a 72 y.o. male  with a history dementia, and Parkinson diease who was reportedly injured in a ground level fall. Initial evaluation was completed at [facility] where work up and imaging a small left frontal lobe intracranial hemorrhage. He was transferred to [facility] in [place], [place].    HOSPITAL COURSE: The patient was triaged as a partial trauma activation. The patient was transported to the trauma intensive care unit.  The patient's traumatic brain injury was managed nonoperatively.  Speech-language pathology was consulted for dysphagia.  The family initially elected to have a Cortrak placed.  A palliative care consult was placed and the family elected to go comfort care with outpatient hospice.    On the day of discharge, the patient had baseline mentation.  He was tolerating an oral diet.  He was awake and interactive.  He had evolving facial ecchymosis and edema on the left.  He will be discharged with home hospice in place on 8/29/2022.    HOSPITAL PROBLEM LIST:  Discharge planning issues- (present on  admission)  Assessment & Plan  8/26 Comfort care measures.  Hospice referral placed.    Intraparenchymal hematoma of brain due to trauma without loss of consciousness, initial encounter (HCC)- (present on admission)  Assessment & Plan  Transfer CT imaging demonstrated to small left frontal lobe intracranial hemorrhages measuring 9 and 3 mm in the greatest diameter. BIG 3.  Repeat interval CT imaging of the brain stable.  Non-operative management.   Post traumatic pharmacologic seizure prophylaxis for 1 week.  Speech Language Pathology cognitive evaluation.  8/28:  GCS 13. This may be baseline.  Advanced directive.  Family conference to DNR/DNI.  Didier Banegas MD. Neurosurgeon. [facility] [place].     Dysphagia- (present on admission)  Assessment & Plan  8/23 Failed swallow evaluation.  NPO with consideration for non-oral nutrition source.  8/24 Failed swallow evaluation.  Cortrak placed.   8/26 Diet as tolerated.  Comfort Care measures.     Lab test positive for detection of COVID-19 virus- (present on admission)  Assessment & Plan  Exposure from family member  Positive home test on 8/17, minimal symptoms.  Per Infectious Disease may come out of isolation on 8/28.   Isolation precautions    Dementia (HCC)- (present on admission)  Assessment & Plan  Chronic condition treated with donepezil. Baseline A&O x 1 reported by EMS.  8/24 Resumed maintenance medication.  8/24 Palliative care consult for advance care planning.  Patient lives at home with wife and caregiver.    Parkinson disease (HCC)- (present on admission)  Assessment & Plan  Chronic condition treated with donepezil, carbidopa/sinemet.  8/24 Resumed maintenance medication.     Scalp laceration, initial encounter- (present on admission)  Assessment & Plan  Left frontal contusion and scalp laceration.  Sutured repair at the referring facility.    No contraindication to deep vein thrombosis (DVT) prophylaxis- (present on admission)  Assessment & Plan  Prophylactic  anticoagulation for thrombotic prevention initially contraindicated secondary to elevated bleeding risk.  8/23 Prophylactic dose enoxaparin initiated.   Systemic anticoagulation approved by Neurosurgery.   8/26 Pharmacological DVT prophylaxis stopped, Comfort care measures.     Trauma- (present on admission)  Assessment & Plan  Mechanical ground-level fall.  BIG 3 intracranial hemorrhage.  Trauma Green Transfer Activation.  Praveen Clemens MD. Trauma Surgery.           DISPOSITION: Discharged 8/29/2022  The patient and family were counseled and questions were answered.     DISCHARGE MEDICATIONS:  The patients controlled substance history was reviewed and a controlled substance use informed consent (if applicable) was provided by [facility] and the patient has been prescribed.     Medication List        CONTINUE taking these medications        Instructions   Carbidopa-Levodopa ER 61. MG Cpcr   Take 3 Capsules by mouth 3 times a day.  Dose: 3 Capsule     donepezil 5 MG Tabs  Commonly known as: ARICEPT   Take 20 mg by mouth every morning.  Dose: 20 mg     midodrine 5 MG Tabs  Commonly known as: PROAMATINE   Take 10 mg by mouth in the morning, at noon, and at bedtime.  Dose: 10 mg              ACTIVITY:  As tolerated.    WOUND CARE:  Open to air.  May shower.    DIET:  Orders Placed This Encounter   Procedures    Diet Order Diet: Regular     Standing Status:   Standing     Number of Occurrences:   1     Order Specific Question:   Diet:     Answer:   Regular [1]       FO[facility]:  [facility]  [id number] [facility] [id number] [phone]          TIME SPENT ON DISCHARGE: 35 minutes      __________________________________________FREDI Velazquez.    DD: 8/29/2022 11:34 AM

## 2022-08-29 NOTE — PROGRESS NOTES
0745: Report received, assumed care of patient who is observed to be sleeping at this time. RR WDL on RA. No acute distress. Safety precautions maintained with bed in lowest position, wheels locked, call light and personal items within reach.

## 2022-08-29 NOTE — PROGRESS NOTES
1400: Discharge instructions provided to patients wife (Margaret), pts wife verbalizes understanding.     1437: Patient discharged via stretcher per [employer]

## 2022-08-29 NOTE — CARE PLAN
The patient is Watcher - Medium risk of patient condition declining or worsening    Shift Goals  Clinical Goals: comfort  Patient Goals: Food  Family Goals: ANDRIY    Progress made toward(s) clinical / shift goals:      Patient is not progressing towards the following goals:

## 2022-08-29 NOTE — DISCHARGE PLANNING
Case Management Discharge Planning    Admission Date: 8/22/2022  GMLOS: 4.6  ALOS: 7    6-Clicks ADL Score: 6  6-Clicks Mobility Score: 6  PT and/or OT Eval ordered: Yes  Post-acute Referrals Ordered: Yes  Post-acute Choice Obtained: Yes  Has referral(s) been sent to post-acute provider:  Yes      Anticipated Discharge Dispo: Discharge Disposition: D/T to hospice home (50)  Discharge Address: [facility] [place] [address]  Discharge Contact Phone Number: [phone] (Spouse - Margaret Gaines)    DME Needed: No    Action(s) Taken: Updated Provider/Nurse on Discharge Plan, Choice obtained, Referral(s) sent, Acceptance Received, Transport Arranged , and OTHER: Chart reviewed and discharge plan updated. Discussed patients' treatment and discharge plans with medical and nursing teams during IDT rounds.     RN CM received call from patient's wife, Margaret Gaines [phone], indicating she had received a call from Solomon at [facility] inquiring on if patient had been discharge home yet. She reports Solomon had told her that they were accepting and could start care on Tuesday. She states Solomon told her that patient could be discharged home to her and they would follow-up tomorrow. She indicates her desire for the patient to be discharged home today and states that she has caregiver assistance available to help out today.     RN CM sent transportation request for [facility] transport home to hospice services with [facility]. Awaiting response from Kyler for confirmation of transport time.    RN CM received notification from Abigail Chávez of transportation being set up for today at 1400 to go home with hospice services. Bedside RN and Attending notified. RN CM called to inform spouse; however, there was no answer and a voicemail was left for a return call.    1330: RN CM received call from patient's wife indicating she was entering the hospital and would see RN CM at the bedside. RN CM  met with patient and wife at the bedside explained IMM and obtained acknowledgement of understanding and a signature from patient's wife on the IMM form. The IMM form was scanned to the DPA to upload to the patient's chart.     Escalations Completed: Provider, Ride Line, Bedside RN, and Speciality Provider (Hospice)    Medically Clear: Yes    Next Steps: f/u with medical and nursing teams regarding discharge planning needs/barriers and assist as indicated. f/u with patient and family regarding discharge planning needs/barriers and update discharge plan as indicated.    Barriers to Discharge: None    Is the patient up for discharge tomorrow: No, pending discharge home with hospice today. Awaiting transportation confirmation.

## 2022-08-29 NOTE — INFECTION CONTROL
This patient is considered COVID RECOVERED.    Patient initially tested positive for COVID on 8/17/2022 from an outside facility.  Patient is greater than or equal to 10 days from symptom onset and/or positive test, with symptom improvement.  Per the CDC guidance, this patient no longer requires transmission based precautions.  Patient may be placed on any unit per the bed assignment policy, including placement in a semi-private room with a roommate.

## 2022-08-29 NOTE — DISCHARGE PLANNING
DC Transport Scheduled    Received request at: 1158    Transport Company Scheduled:  [employer]  Spoke with Ynes at [facility] to schedule transport.      Scheduled Date: 08/29/2022  Scheduled Time: 1400    Destination: [address]     Notified care team of scheduled transport via Voalte.     If there are any changes needed to the DC transportation scheduled, please contact [facility] at ext. [phone] between the hours of 3962-7716 Mon-Fri. If outside those hours, contact the ED Case Manager at ext. [phone].

## 2022-08-29 NOTE — DISCHARGE PLANNING
Agency/Facility Name: [facility]  Outcome: DPA left a voicemail for Elida in admissions regarding referral status and Pt being d/c today. DPA requesting a call back.     0947:  Agency/Facility Name: [facility]  Spoke To: Solomon  Outcome: DPA was notified Pt has been accepted and hospice is coordinating with Pt spouse. Hospice states they will start seeing Pt tomorrow, 8/30.     RN JOSE notified.
